# Patient Record
Sex: FEMALE | Race: BLACK OR AFRICAN AMERICAN | NOT HISPANIC OR LATINO | Employment: OTHER | URBAN - METROPOLITAN AREA
[De-identification: names, ages, dates, MRNs, and addresses within clinical notes are randomized per-mention and may not be internally consistent; named-entity substitution may affect disease eponyms.]

---

## 2024-04-22 ENCOUNTER — HOSPITAL ENCOUNTER (INPATIENT)
Facility: HOSPITAL | Age: 69
LOS: 1 days | Discharge: HOME HEALTH | DRG: 580 | End: 2024-04-25
Attending: EMERGENCY MEDICINE | Admitting: INTERNAL MEDICINE
Payer: MEDICARE

## 2024-04-22 DIAGNOSIS — R22.1 NECK SWELLING: ICD-10-CM

## 2024-04-22 DIAGNOSIS — L04.9 NECROTIZING INFLAMMATION OF LYMPH NODE: Primary | ICD-10-CM

## 2024-04-22 DIAGNOSIS — R07.9 CHEST PAIN: ICD-10-CM

## 2024-04-22 DIAGNOSIS — E83.52 HYPERCALCEMIA: ICD-10-CM

## 2024-04-22 LAB
ALBUMIN SERPL-MCNC: 3.6 G/DL (ref 3.4–4.8)
ALBUMIN/GLOB SERPL: 0.8 RATIO (ref 1.1–2)
ALP SERPL-CCNC: 57 UNIT/L (ref 40–150)
ALT SERPL-CCNC: 13 UNIT/L (ref 0–55)
AMPHET UR QL SCN: NEGATIVE
AST SERPL-CCNC: 15 UNIT/L (ref 5–34)
BARBITURATE SCN PRESENT UR: NEGATIVE
BASOPHILS # BLD AUTO: 0.03 X10(3)/MCL
BASOPHILS NFR BLD AUTO: 0.3 %
BENZODIAZ UR QL SCN: NEGATIVE
BILIRUB SERPL-MCNC: 0.5 MG/DL
BUN SERPL-MCNC: 14.1 MG/DL (ref 9.8–20.1)
CALCIUM SERPL-MCNC: 13.7 MG/DL (ref 8.4–10.2)
CANNABINOIDS UR QL SCN: NEGATIVE
CHLORIDE SERPL-SCNC: 100 MMOL/L (ref 98–107)
CO2 SERPL-SCNC: 25 MMOL/L (ref 23–31)
COCAINE UR QL SCN: NEGATIVE
CREAT SERPL-MCNC: 0.71 MG/DL (ref 0.55–1.02)
CRP SERPL-MCNC: 17.4 MG/L
DEPRECATED CALCIDIOL+CALCIFEROL SERPL-MC: 34.1 NG/ML (ref 30–80)
EOSINOPHIL # BLD AUTO: 0.05 X10(3)/MCL (ref 0–0.9)
EOSINOPHIL NFR BLD AUTO: 0.5 %
ERYTHROCYTE [DISTWIDTH] IN BLOOD BY AUTOMATED COUNT: 12.6 % (ref 11.5–17)
ERYTHROCYTE [SEDIMENTATION RATE] IN BLOOD: 28 MM/HR (ref 0–20)
FENTANYL UR QL SCN: NEGATIVE
GFR SERPLBLD CREATININE-BSD FMLA CKD-EPI: >60 MLS/MIN/1.73/M2
GLOBULIN SER-MCNC: 4.5 GM/DL (ref 2.4–3.5)
GLUCOSE SERPL-MCNC: 205 MG/DL (ref 82–115)
HCT VFR BLD AUTO: 39.1 % (ref 37–47)
HGB BLD-MCNC: 13.4 G/DL (ref 12–16)
HOLD SPECIMEN: NORMAL
IMM GRANULOCYTES # BLD AUTO: 0.02 X10(3)/MCL (ref 0–0.04)
IMM GRANULOCYTES NFR BLD AUTO: 0.2 %
LYMPHOCYTES # BLD AUTO: 2.58 X10(3)/MCL (ref 0.6–4.6)
LYMPHOCYTES NFR BLD AUTO: 27.8 %
MCH RBC QN AUTO: 30 PG (ref 27–31)
MCHC RBC AUTO-ENTMCNC: 34.3 G/DL (ref 33–36)
MCV RBC AUTO: 87.7 FL (ref 80–94)
MDMA UR QL SCN: NEGATIVE
MONOCYTES # BLD AUTO: 0.52 X10(3)/MCL (ref 0.1–1.3)
MONOCYTES NFR BLD AUTO: 5.6 %
NEUTROPHILS # BLD AUTO: 6.07 X10(3)/MCL (ref 2.1–9.2)
NEUTROPHILS NFR BLD AUTO: 65.6 %
NRBC BLD AUTO-RTO: 0 %
OPIATES UR QL SCN: NEGATIVE
PCP UR QL: NEGATIVE
PH UR: 7 [PH] (ref 3–11)
PLATELET # BLD AUTO: 477 X10(3)/MCL (ref 130–400)
PMV BLD AUTO: 8.6 FL (ref 7.4–10.4)
POTASSIUM SERPL-SCNC: 4.4 MMOL/L (ref 3.5–5.1)
PROT SERPL-MCNC: 8.1 GM/DL (ref 5.8–7.6)
PTH-INTACT SERPL-MCNC: 23.2 PG/ML (ref 8.7–77)
RBC # BLD AUTO: 4.46 X10(6)/MCL (ref 4.2–5.4)
SODIUM SERPL-SCNC: 132 MMOL/L (ref 136–145)
SPECIFIC GRAVITY, URINE AUTO (.000) (OHS): 1.02 (ref 1–1.03)
TSH SERPL-ACNC: 0.7 UIU/ML (ref 0.35–4.94)
WBC # SPEC AUTO: 9.27 X10(3)/MCL (ref 4.5–11.5)

## 2024-04-22 PROCEDURE — 63600175 PHARM REV CODE 636 W HCPCS

## 2024-04-22 PROCEDURE — 96372 THER/PROPH/DIAG INJ SC/IM: CPT | Mod: 59

## 2024-04-22 PROCEDURE — G0378 HOSPITAL OBSERVATION PER HR: HCPCS

## 2024-04-22 PROCEDURE — 99285 EMERGENCY DEPT VISIT HI MDM: CPT | Mod: 25

## 2024-04-22 PROCEDURE — 83970 ASSAY OF PARATHORMONE: CPT | Performed by: PHYSICIAN ASSISTANT

## 2024-04-22 PROCEDURE — 82306 VITAMIN D 25 HYDROXY: CPT

## 2024-04-22 PROCEDURE — 85025 COMPLETE CBC W/AUTO DIFF WBC: CPT | Performed by: PHYSICIAN ASSISTANT

## 2024-04-22 PROCEDURE — 96361 HYDRATE IV INFUSION ADD-ON: CPT

## 2024-04-22 PROCEDURE — 96375 TX/PRO/DX INJ NEW DRUG ADDON: CPT

## 2024-04-22 PROCEDURE — 80307 DRUG TEST PRSMV CHEM ANLYZR: CPT

## 2024-04-22 PROCEDURE — 93005 ELECTROCARDIOGRAM TRACING: CPT

## 2024-04-22 PROCEDURE — 25000003 PHARM REV CODE 250: Performed by: PHYSICIAN ASSISTANT

## 2024-04-22 PROCEDURE — 25000003 PHARM REV CODE 250

## 2024-04-22 PROCEDURE — 86140 C-REACTIVE PROTEIN: CPT | Performed by: PHYSICIAN ASSISTANT

## 2024-04-22 PROCEDURE — 85652 RBC SED RATE AUTOMATED: CPT | Performed by: PHYSICIAN ASSISTANT

## 2024-04-22 PROCEDURE — 25500020 PHARM REV CODE 255

## 2024-04-22 PROCEDURE — 84443 ASSAY THYROID STIM HORMONE: CPT | Performed by: PHYSICIAN ASSISTANT

## 2024-04-22 PROCEDURE — 80053 COMPREHEN METABOLIC PANEL: CPT | Performed by: PHYSICIAN ASSISTANT

## 2024-04-22 RX ORDER — ONDANSETRON 4 MG/1
8 TABLET, ORALLY DISINTEGRATING ORAL EVERY 8 HOURS PRN
Status: DISCONTINUED | OUTPATIENT
Start: 2024-04-22 | End: 2024-04-25 | Stop reason: HOSPADM

## 2024-04-22 RX ORDER — TALC
6 POWDER (GRAM) TOPICAL NIGHTLY PRN
Status: DISCONTINUED | OUTPATIENT
Start: 2024-04-22 | End: 2024-04-25 | Stop reason: HOSPADM

## 2024-04-22 RX ORDER — IBUPROFEN 200 MG
16 TABLET ORAL
Status: DISCONTINUED | OUTPATIENT
Start: 2024-04-22 | End: 2024-04-25 | Stop reason: HOSPADM

## 2024-04-22 RX ORDER — ACETAMINOPHEN 325 MG/1
650 TABLET ORAL EVERY 4 HOURS PRN
Status: DISCONTINUED | OUTPATIENT
Start: 2024-04-22 | End: 2024-04-25 | Stop reason: HOSPADM

## 2024-04-22 RX ORDER — CALCITONIN SALMON 200 [USP'U]/ML
4 INJECTION, SOLUTION INTRAMUSCULAR; SUBCUTANEOUS 2 TIMES DAILY
Status: COMPLETED | OUTPATIENT
Start: 2024-04-22 | End: 2024-04-24

## 2024-04-22 RX ORDER — LISINOPRIL 10 MG/1
10 TABLET ORAL DAILY
Status: DISCONTINUED | OUTPATIENT
Start: 2024-04-23 | End: 2024-04-25 | Stop reason: HOSPADM

## 2024-04-22 RX ORDER — LABETALOL HCL 20 MG/4 ML
10 SYRINGE (ML) INTRAVENOUS EVERY 4 HOURS PRN
Status: DISCONTINUED | OUTPATIENT
Start: 2024-04-22 | End: 2024-04-25 | Stop reason: HOSPADM

## 2024-04-22 RX ORDER — ALPRAZOLAM 0.25 MG/1
0.25 TABLET ORAL
Status: COMPLETED | OUTPATIENT
Start: 2024-04-22 | End: 2024-04-22

## 2024-04-22 RX ORDER — SODIUM CHLORIDE 0.9 % (FLUSH) 0.9 %
10 SYRINGE (ML) INJECTION
Status: DISCONTINUED | OUTPATIENT
Start: 2024-04-22 | End: 2024-04-25 | Stop reason: HOSPADM

## 2024-04-22 RX ORDER — ALPRAZOLAM 0.5 MG/1
0.5 TABLET ORAL
Status: COMPLETED | OUTPATIENT
Start: 2024-04-22 | End: 2024-04-22

## 2024-04-22 RX ORDER — HYDRALAZINE HYDROCHLORIDE 20 MG/ML
10 INJECTION INTRAMUSCULAR; INTRAVENOUS
Status: DISCONTINUED | OUTPATIENT
Start: 2024-04-22 | End: 2024-04-25 | Stop reason: HOSPADM

## 2024-04-22 RX ORDER — POLYETHYLENE GLYCOL 3350 17 G/17G
17 POWDER, FOR SOLUTION ORAL DAILY PRN
Status: DISCONTINUED | OUTPATIENT
Start: 2024-04-22 | End: 2024-04-25 | Stop reason: HOSPADM

## 2024-04-22 RX ORDER — PROCHLORPERAZINE EDISYLATE 5 MG/ML
5 INJECTION INTRAMUSCULAR; INTRAVENOUS EVERY 6 HOURS PRN
Status: DISCONTINUED | OUTPATIENT
Start: 2024-04-22 | End: 2024-04-25 | Stop reason: HOSPADM

## 2024-04-22 RX ORDER — ENOXAPARIN SODIUM 100 MG/ML
40 INJECTION SUBCUTANEOUS EVERY 24 HOURS
Status: DISCONTINUED | OUTPATIENT
Start: 2024-04-22 | End: 2024-04-25 | Stop reason: HOSPADM

## 2024-04-22 RX ORDER — NALOXONE HCL 0.4 MG/ML
0.02 VIAL (ML) INJECTION
Status: DISCONTINUED | OUTPATIENT
Start: 2024-04-22 | End: 2024-04-25 | Stop reason: HOSPADM

## 2024-04-22 RX ORDER — SODIUM CHLORIDE 9 MG/ML
1000 INJECTION, SOLUTION INTRAVENOUS
Status: COMPLETED | OUTPATIENT
Start: 2024-04-22 | End: 2024-04-22

## 2024-04-22 RX ORDER — CETIRIZINE HYDROCHLORIDE 10 MG/1
10 TABLET ORAL DAILY PRN
Status: DISCONTINUED | OUTPATIENT
Start: 2024-04-22 | End: 2024-04-25 | Stop reason: HOSPADM

## 2024-04-22 RX ORDER — INSULIN ASPART 100 [IU]/ML
0-5 INJECTION, SOLUTION INTRAVENOUS; SUBCUTANEOUS
Status: DISCONTINUED | OUTPATIENT
Start: 2024-04-23 | End: 2024-04-25 | Stop reason: HOSPADM

## 2024-04-22 RX ORDER — AMOXICILLIN 250 MG
1 CAPSULE ORAL 2 TIMES DAILY PRN
Status: DISCONTINUED | OUTPATIENT
Start: 2024-04-22 | End: 2024-04-25 | Stop reason: HOSPADM

## 2024-04-22 RX ORDER — GLUCAGON 1 MG
1 KIT INJECTION
Status: DISCONTINUED | OUTPATIENT
Start: 2024-04-22 | End: 2024-04-25 | Stop reason: HOSPADM

## 2024-04-22 RX ORDER — IBUPROFEN 200 MG
24 TABLET ORAL
Status: DISCONTINUED | OUTPATIENT
Start: 2024-04-22 | End: 2024-04-25 | Stop reason: HOSPADM

## 2024-04-22 RX ORDER — AMLODIPINE BESYLATE 5 MG/1
5 TABLET ORAL DAILY
Status: DISCONTINUED | OUTPATIENT
Start: 2024-04-22 | End: 2024-04-25 | Stop reason: HOSPADM

## 2024-04-22 RX ORDER — KETOROLAC TROMETHAMINE 30 MG/ML
30 INJECTION, SOLUTION INTRAMUSCULAR; INTRAVENOUS ONCE
Status: COMPLETED | OUTPATIENT
Start: 2024-04-23 | End: 2024-04-22

## 2024-04-22 RX ORDER — SIMETHICONE 80 MG
1 TABLET,CHEWABLE ORAL 3 TIMES DAILY PRN
Status: DISCONTINUED | OUTPATIENT
Start: 2024-04-22 | End: 2024-04-25 | Stop reason: HOSPADM

## 2024-04-22 RX ADMIN — ALPRAZOLAM 0.5 MG: 0.5 TABLET ORAL at 09:04

## 2024-04-22 RX ADMIN — CALCITONIN SALMON 268 UNITS: 200 INJECTION, SOLUTION INTRAMUSCULAR; SUBCUTANEOUS at 10:04

## 2024-04-22 RX ADMIN — IOHEXOL 100 ML: 350 INJECTION, SOLUTION INTRAVENOUS at 07:04

## 2024-04-22 RX ADMIN — ENOXAPARIN SODIUM 40 MG: 40 INJECTION SUBCUTANEOUS at 10:04

## 2024-04-22 RX ADMIN — SODIUM CHLORIDE, POTASSIUM CHLORIDE, SODIUM LACTATE AND CALCIUM CHLORIDE 1000 ML: 600; 310; 30; 20 INJECTION, SOLUTION INTRAVENOUS at 11:04

## 2024-04-22 RX ADMIN — KETOROLAC TROMETHAMINE 30 MG: 30 INJECTION, SOLUTION INTRAMUSCULAR; INTRAVENOUS at 11:04

## 2024-04-22 RX ADMIN — AMLODIPINE BESYLATE 5 MG: 5 TABLET ORAL at 10:04

## 2024-04-22 RX ADMIN — SODIUM CHLORIDE 1000 ML: 9 INJECTION, SOLUTION INTRAVENOUS at 07:04

## 2024-04-22 RX ADMIN — ALPRAZOLAM 0.25 MG: 0.25 TABLET ORAL at 06:04

## 2024-04-22 NOTE — ED PROVIDER NOTES
Encounter Date: 4/22/2024    I, Ted Correa MD, did conduct a face to face encounter with this patient initially seen by our advanced practice provider. I did perform a history and physical, did direct the evaluation and management, and do agree with the care as documented.         History     Chief Complaint   Patient presents with    Facial Swelling     Significant right sided facial and neck swelling x6 weeks. Denies trouble breathing or swallowing.      69-year-old female with past medical history significant for hypertension, hyperlipidemia and diabetes presents to ED complaining of approximately 8 week history right-sided facial and neck swelling.  Patient reports history of dental abscesses, but denies any history of similar neck swelling.  Patient reports she went to her NP around time of symptom onset and they told her it was an abscess and gave her antibiotics.  Reports that the antibiotics did help alleviate the swelling for a short period of time, but it has since returned and progressively worsened.  Reports mild pain that is exacerbated with palpation and neck rotation.  Reports that area of swelling has recently become very pruritic.  States she has never had any mammograms, but denies any breast mass, nipple inversion, skin color/texture changes or nipple discharge.  Denies history of smoking.  Also denies fever, chills, unintended weight loss, appetite changes, night sweats, generalized weakness, fatigue, dysphagia, stridor, drooling, trismus, dental pain chest pain, shortness of breath, hemoptysis, abdominal pain, blood in stool.  Patient is tachycardic on arrival with heart rate of 115.  Vitals otherwise unremarkable, patient is in no acute distress, but is tearful and reports she is very anxious regarding her current diagnosis.        Review of patient's allergies indicates:   Allergen Reactions    Codeine     Lortab [hydrocodone-acetaminophen] Hallucinations    Tramadol      No past  medical history on file.  No past surgical history on file.  No family history on file.     Review of Systems   All other systems reviewed and are negative.      Physical Exam     Initial Vitals [04/22/24 1810]   BP Pulse Resp Temp SpO2   (!) 161/90 (!) 115 20 97.7 °F (36.5 °C) 97 %      MAP       --         Physical Exam    Nursing note and vitals reviewed.  Constitutional: She appears well-developed and well-nourished. She is not diaphoretic. No distress.   HENT:   Head: Normocephalic and atraumatic.   Mouth/Throat: Uvula is midline, oropharynx is clear and moist and mucous membranes are normal. No trismus in the jaw. Abnormal dentition. Dental caries present. No dental abscesses or uvula swelling. No oropharyngeal exudate, posterior oropharyngeal edema or posterior oropharyngeal erythema.   Eyes: Conjunctivae and EOM are normal. Pupils are equal, round, and reactive to light.   Neck: Neck supple.       Normal range of motion.  Cardiovascular:  Normal rate, regular rhythm, normal heart sounds and intact distal pulses.     Exam reveals no gallop and no friction rub.       No murmur heard.  Pulmonary/Chest: Breath sounds normal. No stridor. No respiratory distress. She has no wheezes. She has no rhonchi. She has no rales.   Abdominal: Abdomen is soft. Bowel sounds are normal. She exhibits no distension. There is no abdominal tenderness. There is no rebound and no guarding.   Musculoskeletal:         General: No tenderness or edema. Normal range of motion.      Cervical back: Normal range of motion and neck supple. Edema present. No rigidity. Normal range of motion.     Lymphadenopathy:        Right: Supraclavicular adenopathy present.   Neurological: She is alert and oriented to person, place, and time. She has normal strength. No cranial nerve deficit. GCS score is 15. GCS eye subscore is 4. GCS verbal subscore is 5. GCS motor subscore is 6.   Skin: Skin is warm and dry. Capillary refill takes less than 2 seconds.  No rash noted. No pallor.   Psychiatric: Her speech is normal and behavior is normal. Judgment and thought content normal. Her mood appears anxious. Her affect is not angry, not blunt, not labile and not inappropriate. She is not actively hallucinating. Cognition and memory are normal. She does not exhibit a depressed mood. She is attentive.         ED Course   Procedures  Labs Reviewed   COMPREHENSIVE METABOLIC PANEL - Abnormal; Notable for the following components:       Result Value    Sodium Level 132 (*)     Glucose Level 205 (*)     Calcium Level Total 13.7 (*)     Protein Total 8.1 (*)     Globulin 4.5 (*)     Albumin/Globulin Ratio 0.8 (*)     All other components within normal limits   CBC WITH DIFFERENTIAL - Abnormal; Notable for the following components:    Platelet 477 (*)     All other components within normal limits   C-REACTIVE PROTEIN - Abnormal; Notable for the following components:    C-Reactive Protein 17.40 (*)     All other components within normal limits   SEDIMENTATION RATE, AUTOMATED - Abnormal; Notable for the following components:    Sed Rate 28 (*)     All other components within normal limits   TSH - Normal   PTH, INTACT - Normal   VITAMIN D - Normal   CBC W/ AUTO DIFFERENTIAL    Narrative:     The following orders were created for panel order CBC Auto Differential.  Procedure                               Abnormality         Status                     ---------                               -----------         ------                     CBC with Differential[[id number]]       Abnormal            Final result                 Please view results for these tests on the individual orders.   EXTRA TUBES    Narrative:     The following orders were created for panel order EXTRA TUBES.  Procedure                               Abnormality         Status                     ---------                               -----------         ------                     Light Blue Top Hold[[id number]]                              Final result               Gold Top Hold[[id number]]                                   Final result               Pink Top Hold[[id number]]                                   Final result                 Please view results for these tests on the individual orders.   LIGHT BLUE TOP HOLD   GOLD TOP HOLD   PINK TOP HOLD   PROTEIN ELECTROPHORESIS, TIMED URINE   IMMUNOGLOBULIN FREE LT CHAINS BLOOD   IMMUNOFIXATION AND PROTEIN ELECTROPHORESIS     EKG Readings: (Independently Interpreted)   Initial Reading: No STEMI. Rhythm: Sinus Tachycardia. Heart Rate: 112. Ectopy: No Ectopy. ST Segments: Normal ST Segments. Axis: Normal. [facility] ED EKG2 - Other Findings: Shortened QT interval. Clinical Impression: Normal Sinus Rhythm     ECG Results              EKG 12-lead (In process)        Collection Time Result Time QRS Duration [facility] QTC Calculation    04/22/24 19:41:44 04/23/24 06:45:35 74 395                     In process by Interface, Lab In [facility] (04/23/24 06:45:42)                   Narrative:    Test Reason : E83.52,    Vent. Rate : 112 BPM     Atrial Rate : 112 BPM     P-R Int : 146 ms          QRS Dur : 074 ms      QT Int : 290 ms       P-R-T Axes : 049 011 033 degrees     QTc Int : 395 ms    Sinus tachycardia  Possible Left atrial enlargement  Low voltage QRS  Cannot rule out Anterior infarct ,age undetermined  Abnormal ECG  No previous ECGs available    Referred By: [email or web address]   SELF           Confirmed By:                       In process by Interface, Lab In [facility] (04/23/24 06:42:51)                   Narrative:    Test Reason : E83.52,    Vent. Rate : 112 BPM     Atrial Rate : 112 BPM     P-R Int : 146 ms          QRS Dur : 074 ms      QT Int : 290 ms       P-R-T Axes : 049 011 033 degrees     QTc Int : 395 ms    Sinus tachycardia  Possible Left atrial enlargement  Low voltage QRS  Cannot rule out Anterior infarct ,age undetermined  Abnormal ECG  No previous ECGs available    Referred By:  [email or web address]   SELF           Confirmed By:                                   Imaging Results              CT Soft Tissue Neck With Contrast (Final result)  Result time 04/22/24 20:47:44      Final result by Hilton Ricks MD (04/22/24 20:47:44)                   Impression:      Bilateral neck lymphadenopathy which is extensive on the right with central necrotic changes.  Findings may represent lymphoma leukemia leukemia or metastatic pathology.      Electronically signed by: Hilton Ricks  Date:    04/22/2024  Time:    20:47               Narrative:    EXAMINATION:  CT SOFT TISSUE NECK WITH CONTRAST    CLINICAL HISTORY:  large R sided neck mass extending into chest;    TECHNIQUE:  Multidetector axial images were performed of the neck following intravenous contrast administration and the images were reformatted.    Dose length product was 978 mGycm. Automated radiation control was utilized to minimize radiation dose.    COMPARISON:  None available    FINDINGS:  There are right neck intraparotid, periparotid, submandibular, jugulodigastric, deep cervical and lower neck numerous variable size masses consistent with lymphadenopathy.  Reference right jugulodigastric enlarged lymph node is 2.4 x 2.2 cm on image 39 series 2.  Two adjacent large centrally necrotic lymph nodes within the lower neck measure 3.7 x 4.5 cm and 3.5 x 3.7 cm on image 54 series 2.  There are also mildly enlarged left neck lymph nodes.    There are no parapharyngeal including fossa of Rosenmuller inflammatory changes. The epiglottis is not thickened and there are no findings of the larynx and the trachea.There is no asymmetric fullness or inflammatory changes of the aryepiglottic folds.                                       CT Chest Abdomen Pelvis With IV Contrast (XPD) Routine Oral Contrast (Final result)  Result time 04/22/24 20:55:14      Final result by Hilton Ricks MD (04/22/24 20:55:14)                   Impression:      1.  Right lower  neck large necrotic lymph node proceeds inferiorly to the axillary region.  Additional right supraclavicular and infraclavicular lymph nodes.  No mediastinal or hilar lymphadenopathy.    2.  No chest acute infiltrates or effusions.    3.  No abdominopelvic visceral acute findings or lymphadenopathy.  Details of the findings above.      Electronically signed by: Hilton Ricks  Date:    04/22/2024  Time:    20:55               Narrative:    EXAMINATION:  CT CHEST ABDOMEN PELVIS WITH IV CONTRAST (XPD)    CLINICAL HISTORY:  large upper chest mass extending into R side of neck;    TECHNIQUE:  Multidetector axial images were obtained from the thoracic inlet through the greater trochanters following the administration of IV contrast.    Dose length product of 978 mGycm. Automated exposure control was utilized to minimize radiation dose.    COMPARISON:  None available.    CHEST FINDINGS:    The lungs are unremarkable without suspicious soft tissue pulmonary nodule, parenchyma consolidation, interstitial disease, pleural effusion or pneumothorax.  There are right lower lung lobe hypoventilatory changes.    There are right supraclavicular and infraclavicular enlarged lymph nodes.  Right lower neck large  necrotic lymph node proceeds inferiorly towards the axillary region.  There are no mediastinal or hilar enlarged lymph nodes.  Thoracic aorta is without aneurysmal dilatation or dissection.  Cardiac chamber size is within normal limits.    ABDOMINAL FINDINGS:    Liver and spleen are not enlarged in size.  Right hepatic lobe small hypodensity is not characterized and may represent a cyst on image 50 series 2.  Gallbladder lumen is without calcified calculus and there is no thickening of the wall or pericholecystic fluid.  No apparent dilatation of ducts.  Pancreas is unremarkable.  Adrenals are normal size configuration.  Unremarkable enhancement of kidneys and the collecting systems are without dilatation.  There are no  mesenteric or retroperitoneal periaortic enlarged lymph nodes.    Stomach is partially decompressed.  No abnormal dilatation of loops of small bowel and there is no focal or generalized mural thickening.  Colonic fecal loading without abnormal dilatation and there are no pericolonic acute strandings.  There is sigmoid colon noninflamed diverticulosis coli.  No bowel obstruction.  No free fluid.    PELVIC FINDINGS:    Urinary bladder wall is not thickened.  There is lobular contour of the uterus which may be related to fibroid.  Endometrium is not delineated.  No pelvic free fluid.  There are subcentimeter bilateral inguinal lymph nodes.    No acute or aggressive skeletal abnormality                                       Medications   sodium chloride 0.9% flush 10 mL (has no administration in time range)   melatonin tablet 6 mg (has no administration in time range)   ondansetron disintegrating tablet 8 mg (has no administration in time range)   prochlorperazine injection Soln 5 mg (has no administration in time range)   polyethylene glycol packet 17 g (has no administration in time range)   senna-docusate 8.6-50 mg per tablet 1 tablet (has no administration in time range)   acetaminophen tablet 650 mg (650 mg Oral Given 4/23/24 0131)   naloxone 0.4 mg/mL injection 0.02 mg (has no administration in time range)   glucose chewable tablet 16 g (has no administration in time range)   glucose chewable tablet 24 g (has no administration in time range)   glucagon (human recombinant) injection 1 mg (has no administration in time range)   enoxaparin injection 40 mg (40 mg Subcutaneous Given 4/23/24 6274)   dextrose 10% bolus 125 mL 125 mL (has no administration in time range)   dextrose 10% bolus 250 mL 250 mL (has no administration in time range)   calcitonin injection 268 Units (268 Units Subcutaneous Given 4/23/24 0937)   hydrALAZINE injection 10 mg (has no administration in time range)   labetalol 20 mg/4 mL (5 mg/mL) IV  syring (has no administration in time range)   cetirizine tablet 10 mg (has no administration in time range)   simethicone chewable tablet 80 mg (has no administration in time range)   amLODIPine tablet 5 mg (5 mg Oral Given 4/23/24 0807)   insulin aspart U-100 injection 0-5 Units (has no administration in time range)   lisinopriL tablet 10 mg (10 mg Oral Given 4/23/24 0807)   atorvastatin tablet 20 mg (20 mg Oral Given 4/23/24 0016)   0.9%  NaCl infusion ( Intravenous Verify Only 4/23/24 1902)   ALPRAZolam tablet 0.25 mg (0.25 mg Oral Given 4/22/24 1855)   iohexoL (OMNIPAQUE 350) 350 mg iodine/mL injection (100 mLs Intravenous Given 4/22/24 1900)   0.9%  NaCl infusion (0 mLs Intravenous Stopped 4/22/24 2045)   ALPRAZolam tablet 0.5 mg (0.5 mg Oral Given 4/22/24 2118)   lactated ringers bolus 1,000 mL (0 mLs Intravenous Stopped 4/23/24 0302)   ketorolac injection 30 mg (30 mg Intravenous Given 4/22/24 2334)   ibuprofen tablet 800 mg (800 mg Oral Given 4/23/24 0807)   zoledronic acid (ZOMETA) 4 mg in sodium chloride 0.9% 100 mL IVPB (0 mg Intravenous Stopped 4/23/24 1101)     Medical Decision Making  Differential diagnosis: Includes but not limited to malignancy, benign mass, abscess    ED management:  Patient given 0.25 mg dose of Xanax for anxiety, improving.  Once notified of critical hypercalcemia, I have initiated IV fluids with normal saline bolus.    ED course:  CT soft tissue neck reveals bilateral neck lymphadenopathy that is extensive on the right with central necrotic changes concerning for malignant process.  There is no evidence of impending airway compromise on CT.  CT of chest, abdomen and pelvis reflects findings from neck CT, along with right supraclavicular and infraclavicular lymph nodes, but shows no evidence of any additional acute chest or abdominopelvic findings.   CBC reveals mild thrombocytosis, but is overall unremarkable.  CMP reveals critical hypercalcemia the correct to 14.  CMP also  reveals mild hyponatremia of 132 and mild hyperglycemia of 205 with normal anion gap.  PTH within normal limits.  TSH within normal limits.  Inflammatory markers mildly elevated with CRP of 17.4 and sed rate of 28.  EKG obtained and shows mild shortening of QT interval, but overall unremarkable.      Amount and/or Complexity of Data Reviewed  Labs: ordered. Decision-making details documented in ED Course.     Details:  Patient currently resting comfortably in bed with heart rate of 110, but overall unremarkable vitals.  I have consulted medicine for admission for hypercalcemia and malignancy workup.  Patient informed of today's findings and is amenable to admission.  Radiology: ordered. Decision-making details documented in ED Course.  ECG/medicine tests: ordered and independent interpretation performed. Decision-making details documented in ED Course.  Discussion of management or test interpretation with external provider(s): Case discussed with Dr. Correa and he reviewed all of today's diagnostic workup and performed face-to-face evaluation at bedside, all of his recommendations followed.    Risk  Prescription drug management.               ED Course as of 04/23/24 2118 Mon Apr 22, 2024 1926 Nurse taking care of patient informed me of critical calcium of 13.7, which is 14 when corrected for mild hypoalbuminemia. I have ordered EKG, cardiac monitoring & PTH. [NB]      ED Course User Index  [NB] Darwin Benson PA                           Clinical Impression:  Final diagnoses:  [E83.52] Hypercalcemia  [L04.9] Necrotizing inflammation of lymph node - Right side of neck, extensive (Primary)          ED Disposition Condition    Observation                 Darwin Benson PA  04/22/24 2105       Ted Correa MD  04/23/24 2118

## 2024-04-23 LAB
ALBUMIN SERPL-MCNC: 3.2 G/DL (ref 3.4–4.8)
ALBUMIN/GLOB SERPL: 0.7 RATIO (ref 1.1–2)
ALP SERPL-CCNC: 52 UNIT/L (ref 40–150)
ALT SERPL-CCNC: 12 UNIT/L (ref 0–55)
APPEARANCE UR: CLEAR
AST SERPL-CCNC: 15 UNIT/L (ref 5–34)
BACTERIA #/AREA URNS AUTO: ABNORMAL /HPF
BASOPHILS # BLD AUTO: 0.02 X10(3)/MCL
BASOPHILS NFR BLD AUTO: 0.3 %
BILIRUB SERPL-MCNC: 0.5 MG/DL
BILIRUB UR QL STRIP.AUTO: NEGATIVE
BUN SERPL-MCNC: 7.5 MG/DL (ref 9.8–20.1)
CALCIUM SERPL-MCNC: 12.2 MG/DL (ref 8.4–10.2)
CHLORIDE SERPL-SCNC: 104 MMOL/L (ref 98–107)
CO2 SERPL-SCNC: 27 MMOL/L (ref 23–31)
COLOR UR AUTO: COLORLESS
CREAT SERPL-MCNC: 0.57 MG/DL (ref 0.55–1.02)
EOSINOPHIL # BLD AUTO: 0.03 X10(3)/MCL (ref 0–0.9)
EOSINOPHIL NFR BLD AUTO: 0.4 %
ERYTHROCYTE [DISTWIDTH] IN BLOOD BY AUTOMATED COUNT: 12.7 % (ref 11.5–17)
GFR SERPLBLD CREATININE-BSD FMLA CKD-EPI: >60 MLS/MIN/1.73/M2
GLOBULIN SER-MCNC: 4.3 GM/DL (ref 2.4–3.5)
GLUCOSE SERPL-MCNC: 164 MG/DL (ref 82–115)
GLUCOSE UR QL STRIP.AUTO: NORMAL
HCT VFR BLD AUTO: 36.2 % (ref 37–47)
HGB BLD-MCNC: 12.4 G/DL (ref 12–16)
HIV 1+2 AB+HIV1 P24 AG SERPL QL IA: NONREACTIVE
HYALINE CASTS #/AREA URNS LPF: ABNORMAL /LPF
IGA SERPL-MCNC: 171 MG/DL (ref 69–517)
IGG SERPL-MCNC: 1481 MG/DL (ref 522–1631)
IGM SERPL-MCNC: 104 MG/DL (ref 33–293)
IMM GRANULOCYTES # BLD AUTO: 0.01 X10(3)/MCL (ref 0–0.04)
IMM GRANULOCYTES NFR BLD AUTO: 0.1 %
KETONES UR QL STRIP.AUTO: NEGATIVE
LEUKOCYTE ESTERASE UR QL STRIP.AUTO: NEGATIVE
LYMPHOCYTES # BLD AUTO: 2.33 X10(3)/MCL (ref 0.6–4.6)
LYMPHOCYTES NFR BLD AUTO: 29.2 %
MAGNESIUM SERPL-MCNC: 1.4 MG/DL (ref 1.6–2.6)
MCH RBC QN AUTO: 29.7 PG (ref 27–31)
MCHC RBC AUTO-ENTMCNC: 34.3 G/DL (ref 33–36)
MCV RBC AUTO: 86.8 FL (ref 80–94)
MONOCYTES # BLD AUTO: 0.5 X10(3)/MCL (ref 0.1–1.3)
MONOCYTES NFR BLD AUTO: 6.3 %
NEUTROPHILS # BLD AUTO: 5.08 X10(3)/MCL (ref 2.1–9.2)
NEUTROPHILS NFR BLD AUTO: 63.7 %
NITRITE UR QL STRIP.AUTO: NEGATIVE
NRBC BLD AUTO-RTO: 0 %
PH UR STRIP.AUTO: 7 [PH]
PHOSPHATE SERPL-MCNC: 2 MG/DL (ref 2.3–4.7)
PLATELET # BLD AUTO: 426 X10(3)/MCL (ref 130–400)
PMV BLD AUTO: 8.4 FL (ref 7.4–10.4)
POCT GLUCOSE: 128 MG/DL (ref 70–110)
POCT GLUCOSE: 137 MG/DL (ref 70–110)
POCT GLUCOSE: 152 MG/DL (ref 70–110)
POCT GLUCOSE: 173 MG/DL (ref 70–110)
POTASSIUM SERPL-SCNC: 4.2 MMOL/L (ref 3.5–5.1)
PROT SERPL-MCNC: 7.5 GM/DL (ref 5.8–7.6)
PROT UR QL STRIP.AUTO: NEGATIVE
RBC # BLD AUTO: 4.17 X10(6)/MCL (ref 4.2–5.4)
RBC #/AREA URNS AUTO: ABNORMAL /HPF
RBC UR QL AUTO: ABNORMAL
SODIUM SERPL-SCNC: 137 MMOL/L (ref 136–145)
SP GR UR STRIP.AUTO: 1.02 (ref 1–1.03)
SQUAMOUS #/AREA URNS LPF: ABNORMAL /HPF
UROBILINOGEN UR STRIP-ACNC: NORMAL
WBC # SPEC AUTO: 7.97 X10(3)/MCL (ref 4.5–11.5)
WBC #/AREA URNS AUTO: ABNORMAL /HPF

## 2024-04-23 PROCEDURE — 84166 PROTEIN E-PHORESIS/URINE/CSF: CPT

## 2024-04-23 PROCEDURE — 96372 THER/PROPH/DIAG INJ SC/IM: CPT

## 2024-04-23 PROCEDURE — 63600175 PHARM REV CODE 636 W HCPCS: Performed by: INTERNAL MEDICINE

## 2024-04-23 PROCEDURE — 86665 EPSTEIN-BARR CAPSID VCA: CPT

## 2024-04-23 PROCEDURE — 84100 ASSAY OF PHOSPHORUS: CPT

## 2024-04-23 PROCEDURE — 86644 CMV ANTIBODY: CPT

## 2024-04-23 PROCEDURE — 83521 IG LIGHT CHAINS FREE EACH: CPT

## 2024-04-23 PROCEDURE — 25000003 PHARM REV CODE 250: Performed by: INTERNAL MEDICINE

## 2024-04-23 PROCEDURE — 63600175 PHARM REV CODE 636 W HCPCS

## 2024-04-23 PROCEDURE — 96365 THER/PROPH/DIAG IV INF INIT: CPT

## 2024-04-23 PROCEDURE — 82397 CHEMILUMINESCENT ASSAY: CPT

## 2024-04-23 PROCEDURE — 82652 VIT D 1 25-DIHYDROXY: CPT | Performed by: INTERNAL MEDICINE

## 2024-04-23 PROCEDURE — 96361 HYDRATE IV INFUSION ADD-ON: CPT

## 2024-04-23 PROCEDURE — 81001 URINALYSIS AUTO W/SCOPE: CPT

## 2024-04-23 PROCEDURE — 25000003 PHARM REV CODE 250

## 2024-04-23 PROCEDURE — 82784 ASSAY IGA/IGD/IGG/IGM EACH: CPT

## 2024-04-23 PROCEDURE — 80053 COMPREHEN METABOLIC PANEL: CPT

## 2024-04-23 PROCEDURE — 85025 COMPLETE CBC W/AUTO DIFF WBC: CPT

## 2024-04-23 PROCEDURE — 36415 COLL VENOUS BLD VENIPUNCTURE: CPT

## 2024-04-23 PROCEDURE — 84165 PROTEIN E-PHORESIS SERUM: CPT

## 2024-04-23 PROCEDURE — 83735 ASSAY OF MAGNESIUM: CPT

## 2024-04-23 PROCEDURE — 87389 HIV-1 AG W/HIV-1&-2 AB AG IA: CPT

## 2024-04-23 PROCEDURE — G0378 HOSPITAL OBSERVATION PER HR: HCPCS

## 2024-04-23 RX ORDER — ATORVASTATIN CALCIUM 20 MG/1
20 TABLET, FILM COATED ORAL NIGHTLY
Status: DISCONTINUED | OUTPATIENT
Start: 2024-04-23 | End: 2024-04-25 | Stop reason: HOSPADM

## 2024-04-23 RX ORDER — KETOROLAC TROMETHAMINE 10 MG/1
10 TABLET, FILM COATED ORAL EVERY 8 HOURS PRN
Status: DISCONTINUED | OUTPATIENT
Start: 2024-04-23 | End: 2024-04-23

## 2024-04-23 RX ORDER — IBUPROFEN 400 MG/1
800 TABLET ORAL ONCE
Status: COMPLETED | OUTPATIENT
Start: 2024-04-23 | End: 2024-04-23

## 2024-04-23 RX ORDER — OXYCODONE HYDROCHLORIDE 5 MG/1
5 TABLET ORAL EVERY 8 HOURS PRN
Status: DISCONTINUED | OUTPATIENT
Start: 2024-04-23 | End: 2024-04-25

## 2024-04-23 RX ORDER — SODIUM CHLORIDE 9 MG/ML
INJECTION, SOLUTION INTRAVENOUS CONTINUOUS
Status: DISCONTINUED | OUTPATIENT
Start: 2024-04-23 | End: 2024-04-25

## 2024-04-23 RX ORDER — ZOLEDRONIC ACID 0.04 MG/ML
4 INJECTION, SOLUTION INTRAVENOUS
Status: DISCONTINUED | OUTPATIENT
Start: 2024-04-23 | End: 2024-04-23

## 2024-04-23 RX ADMIN — CALCITONIN SALMON 268 UNITS: 200 INJECTION, SOLUTION INTRAMUSCULAR; SUBCUTANEOUS at 09:04

## 2024-04-23 RX ADMIN — ATORVASTATIN CALCIUM 20 MG: 20 TABLET, FILM COATED ORAL at 09:04

## 2024-04-23 RX ADMIN — ZOLEDRONIC ACID 4 MG: 4 INJECTION, SOLUTION, CONCENTRATE INTRAVENOUS at 10:04

## 2024-04-23 RX ADMIN — AMLODIPINE BESYLATE 5 MG: 5 TABLET ORAL at 08:04

## 2024-04-23 RX ADMIN — ENOXAPARIN SODIUM 40 MG: 40 INJECTION SUBCUTANEOUS at 05:04

## 2024-04-23 RX ADMIN — ACETAMINOPHEN 650 MG: 325 TABLET, FILM COATED ORAL at 01:04

## 2024-04-23 RX ADMIN — IBUPROFEN 800 MG: 400 TABLET, FILM COATED ORAL at 08:04

## 2024-04-23 RX ADMIN — OXYCODONE HYDROCHLORIDE 5 MG: 5 TABLET ORAL at 09:04

## 2024-04-23 RX ADMIN — SODIUM CHLORIDE: 9 INJECTION, SOLUTION INTRAVENOUS at 08:04

## 2024-04-23 RX ADMIN — ATORVASTATIN CALCIUM 20 MG: 20 TABLET, FILM COATED ORAL at 12:04

## 2024-04-23 RX ADMIN — LISINOPRIL 10 MG: 10 TABLET ORAL at 08:04

## 2024-04-23 NOTE — PROGRESS NOTES
"[facility] Internal Medicine Progress Note    Subjective:   Zoë Bone is a 69 y.o.  female with PMH of tobacco use disorder (quit approximately 2014), hypertension, hyperlipidemia, diabetes mellitus type II, pernicious anemia, dental abscess s/p tooth extraction, who presented to [facility] ED (4/22/2024) due to right sided neck swelling. Patient states that symptoms began approximately 1 month ago at which time patient developed right  sided facial swelling. Patient suspected she had a dental abscess as patient has had a dental abscess in the past requiring tooth extraction (roughly 10-15 years ago). She presented to her PCP for evaluation who patient states told her "she had an abscess" at which time she was prescribed an antibiotic which patient does not recall. Patient adhered to antibiotic regimen with the swelling was reduced at first but swelling continued to worsen such that swelling began to extend to the right neck from the right face prompting patient to present to [facility] ED for evaluation. She denies noting any overlying redness or rashes nor pain to the affected area. Denies fever, chills, sweats. Denies chest pain, cough, hemoptysis, shortness of breath. Denies abdominal pain, decreased appetite, involuntary weight loss, nausea, vomiting, hematemesis, constipation, diarrhea, hematochezia, melena. Denies increased or decreased urinary frequency, dysuria, hematuria. Denies lower extremity swelling, pain, rashes.  Patient has not have a family history of cancer.  She denies any use of oral tobacco, IV drugs, HIV, or any recent sick contacts.     ED course: Vitals show patient hypertensive, tachycardic, and tachypnic but saturations stable on room air. CBC unremarkable. CRP 17.40. ESR 28. CMP showed borderline severe hypercalcemia with calcium 13.7 and hyponatremia with mild sodium 132. CT soft tissue neck (04/23/2024) showed bilateral neck lymphadenopathy more extensive on right compared to left with " "necrotic changes on right neck. CT chest abdomen pelvis with and without contrast (2024) showed continuation of right necrotic lymph node involvement inferiorly to the right axilla but no mediastinal or hilar lymphadenopathy. Internal medicine consulted for evaluation and management of right sided head and neck swelling concerning for underlying malignancy.        24hr interval   Patient resting in bed comfortably.  She was complaining of a headache post Toradol.  She was also complaining of some discomfort at the right facial swelling.  Patient allergic to Norco.  We will try ibuprofen 800.  On physical exam her facial swelling is hard and painless.  CT imaging suggestive of necrotic lymph nodes extending from neck to right axilla.  Concern for lymphoma is very high.  IR consulted for biopsy.  They will see the patient tomorrow and likely attempt biopsy Thursday.  Patient hypercalcemia is improving.  Bisphosphonate added to calcitonin.  Vitamin hemodynamically patient was stable.  she denies fevers, headache, chest pain, SOB, N/V/D and abdominal pain.     Objective:   Last 24 Hour Vital Signs:  BP  Min: 111/67  Max: 173/88  Temp  Av.4 °F (36.9 °C)  Min: 97.7 °F (36.5 °C)  Max: 99 °F (37.2 °C)  Pulse  Av.3  Min: 89  Max: 117  Resp  Av.1  Min: 18  Max: 26  SpO2  Av.7 %  Min: 93 %  Max: 97 %  Height  Av' 2" (157.5 cm)  Min: 5' 2" (157.5 cm)  Max: 5' 2" (157.5 cm)  Weight  Av.1 kg (156 lb 13.3 oz)  Min: 66.8 kg (147 lb 4.3 oz)  Max: 75.5 kg (166 lb 6.4 oz)  I/O last 3 completed shifts:  In: 1120 [P.O.:120; I.V.:1000]  Out: 650 [Urine:650]    Physical Examination:  Physical Examination:  Vitals:   Vitals:    24 1114   BP: 111/67   Pulse: 93   Resp: 18   Temp: 98 °F (36.7 °C)     Physical Exam  Vitals reviewed.   Constitutional:       General: She is in acute distress.      Appearance: Normal appearance. She is normal weight. She is not ill-appearing, toxic-appearing or " diaphoretic.   HENT:      Head: Normocephalic and atraumatic.      Comments: Right face and neck edematous and indurated spanning from right pre and post auricular area down to right clavicle  Excoriations noted to base of right neck      Right Ear: External ear normal.      Left Ear: External ear normal.   Eyes:      General: No scleral icterus.        Right eye: No discharge.         Left eye: No discharge.      Extraocular Movements: Extraocular movements intact.      Conjunctiva/sclera: Conjunctivae normal.      Pupils: Pupils are equal, round, and reactive to light.   Cardiovascular:      Rate and Rhythm: Normal rate and regular rhythm.      Pulses: Normal pulses.      Heart sounds: Normal heart sounds. No murmur heard.     No friction rub. No gallop.   Pulmonary:      Effort: Pulmonary effort is normal. No respiratory distress.      Breath sounds: Normal breath sounds. No wheezing, rhonchi or rales.   Abdominal:      General: Abdomen is flat. Bowel sounds are normal. There is no distension.      Palpations: Abdomen is soft.      Tenderness: There is no abdominal tenderness. There is no guarding.   Musculoskeletal:         General: No swelling, tenderness, deformity or signs of injury. Normal range of motion.      Right lower leg: No edema.      Left lower leg: No edema.   Skin:     General: Skin is warm and dry.      Capillary Refill: Capillary refill takes less than 2 seconds.      Coloration: Skin is not jaundiced.      Findings: No bruising, erythema or rash.   Neurological:      Mental Status: She is alert.      Comments: AAO to person, place, time, and situation; CN II-XII grossly intact     Laboratory:    Recent Labs   Lab 04/22/24  1832 04/23/24  0430   WBC 9.27 7.97   HGB 13.4 12.4   HCT 39.1 36.2*   * 426*   MCV 87.7 86.8   RDW 12.6 12.7   * 137   K 4.4 4.2   CO2 25 27   BUN 14.1 7.5*   CREATININE 0.71 0.57   ALBUMIN 3.6 3.2*   BILITOT 0.5 0.5   AST 15 15   ALKPHOS 57 52   ALT 13 12  "      Coags: No results found for: "INR", "PROTIME", "PTT"  FLP: No results found for: "CHOL", "HDL", "LDLCALC", "TRIG", "CHOLHDL"  DM:   Lab Results   Component Value Date    CREATININE 0.57 04/23/2024     Thyroid:   Lab Results   Component Value Date    TSH 0.703 04/22/2024     Anemia: No results found for: "IRON", "TIBC", "FERRITIN", "RMTPFDGI91", "FOLATE"  Cardiac: No results found for: "TROPONINI", "CKTOTAL", "CKMB", "BNP"  Pulm:   No results found for: "PO2ART", "FIO2", "PEEP"   Urinalysis:   Lab Results   Component Value Date    LABURIN No Growth 06/27/2023    PHUA 7.0 04/23/2024    UROBILINOGEN Normal 04/23/2024    WBCUA 0-5 04/23/2024       Trended Cardiac Data:  No results for input(s): "TROPONINI", "CKTOTAL", "CKMB", "BNP" in the last 168 hours.      Other Results:      Radiology:  Imaging Results              CT Soft Tissue Neck With Contrast (Final result)  Result time 04/22/24 20:47:44      Final result by Hilton Ricks MD (04/22/24 20:47:44)                   Impression:      Bilateral neck lymphadenopathy which is extensive on the right with central necrotic changes.  Findings may represent lymphoma leukemia leukemia or metastatic pathology.      Electronically signed by: Hilton Ricks  Date:    04/22/2024  Time:    20:47               Narrative:    EXAMINATION:  CT SOFT TISSUE NECK WITH CONTRAST    CLINICAL HISTORY:  large R sided neck mass extending into chest;    TECHNIQUE:  Multidetector axial images were performed of the neck following intravenous contrast administration and the images were reformatted.    Dose length product was 978 mGycm. Automated radiation control was utilized to minimize radiation dose.    COMPARISON:  None available    FINDINGS:  There are right neck intraparotid, periparotid, submandibular, jugulodigastric, deep cervical and lower neck numerous variable size masses consistent with lymphadenopathy.  Reference right jugulodigastric enlarged lymph node is 2.4 x 2.2 cm on " image 39 series 2.  Two adjacent large centrally necrotic lymph nodes within the lower neck measure 3.7 x 4.5 cm and 3.5 x 3.7 cm on image 54 series 2.  There are also mildly enlarged left neck lymph nodes.    There are no parapharyngeal including fossa of Rosenmuller inflammatory changes. The epiglottis is not thickened and there are no findings of the larynx and the trachea.There is no asymmetric fullness or inflammatory changes of the aryepiglottic folds.                                       CT Chest Abdomen Pelvis With IV Contrast (XPD) Routine Oral Contrast (Final result)  Result time 04/22/24 20:55:14      Final result by Hilton Ricks MD (04/22/24 20:55:14)                   Impression:      1.  Right lower neck large necrotic lymph node proceeds inferiorly to the axillary region.  Additional right supraclavicular and infraclavicular lymph nodes.  No mediastinal or hilar lymphadenopathy.    2.  No chest acute infiltrates or effusions.    3.  No abdominopelvic visceral acute findings or lymphadenopathy.  Details of the findings above.      Electronically signed by: Hilton Ricks  Date:    04/22/2024  Time:    20:55               Narrative:    EXAMINATION:  CT CHEST ABDOMEN PELVIS WITH IV CONTRAST (XPD)    CLINICAL HISTORY:  large upper chest mass extending into R side of neck;    TECHNIQUE:  Multidetector axial images were obtained from the thoracic inlet through the greater trochanters following the administration of IV contrast.    Dose length product of 978 mGycm. Automated exposure control was utilized to minimize radiation dose.    COMPARISON:  None available.    CHEST FINDINGS:    The lungs are unremarkable without suspicious soft tissue pulmonary nodule, parenchyma consolidation, interstitial disease, pleural effusion or pneumothorax.  There are right lower lung lobe hypoventilatory changes.    There are right supraclavicular and infraclavicular enlarged lymph nodes.  Right lower neck large  necrotic  lymph node proceeds inferiorly towards the axillary region.  There are no mediastinal or hilar enlarged lymph nodes.  Thoracic aorta is without aneurysmal dilatation or dissection.  Cardiac chamber size is within normal limits.    ABDOMINAL FINDINGS:    Liver and spleen are not enlarged in size.  Right hepatic lobe small hypodensity is not characterized and may represent a cyst on image 50 series 2.  Gallbladder lumen is without calcified calculus and there is no thickening of the wall or pericholecystic fluid.  No apparent dilatation of ducts.  Pancreas is unremarkable.  Adrenals are normal size configuration.  Unremarkable enhancement of kidneys and the collecting systems are without dilatation.  There are no mesenteric or retroperitoneal periaortic enlarged lymph nodes.    Stomach is partially decompressed.  No abnormal dilatation of loops of small bowel and there is no focal or generalized mural thickening.  Colonic fecal loading without abnormal dilatation and there are no pericolonic acute strandings.  There is sigmoid colon noninflamed diverticulosis coli.  No bowel obstruction.  No free fluid.    PELVIC FINDINGS:    Urinary bladder wall is not thickened.  There is lobular contour of the uterus which may be related to fibroid.  Endometrium is not delineated.  No pelvic free fluid.  There are subcentimeter bilateral inguinal lymph nodes.    No acute or aggressive skeletal abnormality                                      Current Medications:     Infusions:  Current Facility-Administered Medications   Medication Dose Route Frequency Last Rate Last Admin    sodium chloride 0.9%   Intravenous Continuous 150 mL/hr at 04/23/24 0808 New Bag at 04/23/24 0808        Scheduled:  Current Facility-Administered Medications   Medication Dose Route Frequency    amLODIPine  5 mg Oral Daily    atorvastatin  20 mg Oral QHS    calcitonin  4 Units/kg Subcutaneous BID    enoxparin  40 mg Subcutaneous Daily    lisinopriL  10 mg  Oral Daily        PRN:    Current Facility-Administered Medications:     acetaminophen, 650 mg, Oral, Q4H PRN    cetirizine, 10 mg, Oral, Daily PRN    dextrose 10%, 12.5 g, Intravenous, PRN    dextrose 10%, 25 g, Intravenous, PRN    glucagon (human recombinant), 1 mg, Intramuscular, PRN    glucose, 16 g, Oral, PRN    glucose, 24 g, Oral, PRN    hydrALAZINE, 10 mg, Intravenous, Q2H PRN    insulin aspart U-100, 0-5 Units, Subcutaneous, QID (AC + HS) PRN    labetalol, 10 mg, Intravenous, Q4H PRN    melatonin, 6 mg, Oral, Nightly PRN    naloxone, 0.02 mg, Intravenous, PRN    ondansetron, 8 mg, Oral, Q8H PRN    polyethylene glycol, 17 g, Oral, Daily PRN    prochlorperazine, 5 mg, Intravenous, Q6H PRN    senna-docusate 8.6-50 mg, 1 tablet, Oral, BID PRN    simethicone, 1 tablet, Oral, TID PRN    sodium chloride 0.9%, 10 mL, Intravenous, PRN      Assessment:     Zoë Bone is a 69 y.o.female with  Patient Active Problem List    Diagnosis Date Noted    Neck swelling 04/22/2024        Plan:     Tobacco use disorder (quit 30 years ago), 20 pack-year history  Right facial and neck edema  Concern for squamous cell carcinoma  -afebrile; wbc wnl without left shift  -CRP 17.40 and ESR 28  -CT soft tissue neck (04/23/2024) showed bilateral neck lymphadenopathy more extensive on right compared to left with necrotic changes on right neck  -CT chest abdomen pelvis with and without contrast (04/22/2024) showed continuation of right necrotic lymph node involvement inferiorly to the right axilla but no mediastinal or hilar lymphadenopathy  -patient reports she was seen by PCP as outpatient and was told she had an abscess which was treated with an abx that she took twice daily (patient nor family remember name) however symptoms did not improve with abx therapy  -presentation concerning for malignancy likely squamous cell given significant smoking history (approximately 2 ppd x 40 years).  But given the aggressive nature lymphoma is also  high on differential.  - consulted IR for biopsy.  They will see the patient 4/24 and we will attempt biopsy 4/25 after evaluation.  -will plan to refer to hematology and oncology at discharge  -HIV negative  -patient tachycardic and tachypnic on admission however patient was also deeply emotional after being told results of imaging and concern for possible malignancy  -saturations stable  -tachypnea subsequently resolved with improvement in mood.  Tachycardia also resolved.  -EKG on admission showed sinus tachycardia with low voltage QRS in V1-V6  -patient already received IV contrast imaging while in        Hypercalcemia (improving)  Corrected calcium 14.02   -hypercalcemia likely 2/2 underlying malignancy  -given 2L LR while in ED  -denies any bone pain, any abdominal pains component, any urinary complaints, or change in mentation.  -initiated IV calcitonin 4 units/kg bid x 2 days  -patient was NS continuous flow 150 mL an hour.  -zoledronic acid 4 mg IV started.  -slight reduction and calcium.  -will continue to titrate calcitonin until hypercalcemia resolves.  Bisphosphonate we will likely take 2 days before response to treatment is seen.     Hypertension (controlled)  -restarted home lisinopril 10 mg qd at home  -initiated amlodipine 5 mg qd for additionally bp control  -continue prn hydralazine and labetalol per antihypertensive parameters  -will continue to titrate antihypertensives as indicated     Hyperlipidemia (being treated)  -on pravastatin 20 mg qd as outpatient  -pravastatin not on formulary  -initiated atorvastatin 20 mg qd     Diabetes mellitus type II (controlled)  -glucose 203 on admission  -goal blood glucose < 180  -on novolin 70/30 at home  -holding home insulin  -initiated LDSSI  -will continue to monitor and titrate insulin regimen as indicated     Code:  Full code  Diet:  Diabetic  DVT/GI PPX:  Lovenox  Dispo:  None    Kody Goldberg M.D  [facility] PGY-1  04/23/2024

## 2024-04-23 NOTE — PLAN OF CARE
Received call from Ashvin with [facility], P: [phone], stating that patient has been accepted.

## 2024-04-23 NOTE — CONSULTS
Consult for home health noted. Patient is in agreement with no preference. Referral packet and HH order have been sent to [facility] via [facility].

## 2024-04-23 NOTE — PLAN OF CARE
04/23/24 1517   Discharge Assessment   Assessment Type Discharge Planning Assessment   Confirmed/corrected address, phone number and insurance Yes   Confirmed Demographics Correct on Facesheet   Source of Information patient   When was your last doctors appointment?   (PCP: Doris Tobias)   Does patient/caregiver understand observation status Yes   Communicated PEDRO with patient/caregiver Date not available/Unable to determine   Reason For Admission Hypercalcemia; Chest pain; Necrotizing inflammation of lymph node   People in Home significant other   Facility Arrived From: Home   Do you expect to return to your current living situation? Yes   Do you have help at home or someone to help you manage your care at home? Yes   Who are your caregiver(s) and their phone number(s)? Reji Maradiaga, SO, P: [phone]; Mansi Rosen, daughter, P: [phone]   Prior to hospitilization cognitive status: Alert/Oriented;No Deficits   Current cognitive status: Alert/Oriented;No Deficits   Walking or Climbing Stairs Difficulty no   Dressing/Bathing Difficulty no   Home Accessibility not wheelchair accessible;stairs to enter home   Number of Stairs, Main Entrance four   Stair Railings, Main Entrance railings safe and in good condition;railings on both sides of stairs   Home Layout Able to live on 1st floor   Equipment Currently Used at Home blood pressure machine   Readmission within 30 days? No   Patient currently being followed by outpatient case management? No   Do you currently have service(s) that help you manage your care at home? No   Do you take prescription medications? Yes  ([facility] or [facility] in [place])   Do you have prescription coverage? Yes   Coverage [employer]   Do you have any problems affording any of your prescribed medications? No   Is the patient taking medications as prescribed? yes   Who is going to help you get home at discharge? Family   How do you get to doctors  appointments? family or friend will provide   Are you on dialysis? No   Do you take coumadin? No   Discharge Plan A Home with family;Home Health   DME Needed Upon Discharge  none   Discharge Plan discussed with: Patient   Transition of Care Barriers None   Physical Activity   On average, how many days per week do you engage in moderate to strenuous exercise (like a brisk walk)? 5 days   On average, how many minutes do you engage in exercise at this level? 30 min   Financial Resource Strain   How hard is it for you to pay for the very basics like food, housing, medical care, and heating? Somewhat   Housing Stability   In the last 12 months, was there a time when you were not able to pay the mortgage or rent on time? N   In the past 12 months, how many times have you moved where you were living? 1   At any time in the past 12 months, were you homeless or living in a shelter (including now)? N   Transportation Needs   In the past 12 months, has lack of transportation kept you from medical appointments or from getting medications? no   In the past 12 months, has lack of transportation kept you from meetings, work, or from getting things needed for daily living? No   Food Insecurity   Within the past 12 months, the food you bought just didn't last and you didn't have money to get more. Never true   Stress   Do you feel stress - tense, restless, nervous, or anxious, or unable to sleep at night because your mind is troubled all the time - these days? Only a littl   Social Connections   In a typical week, how many times do you talk on the phone with family, friends, or neighbors? More than 3   How often do you get together with friends or relatives? More than 3   How often do you attend Jewish or Latter-day services? More than 4   Do you belong to any clubs or organizations such as Jewish groups, unions, fraternal or athletic groups, or school groups? No   How often do you attend meetings of the clubs or organizations you  belong to? Never   Are you , , , , never , or living with a partner? Living with   Alcohol Use   Q1: How often do you have a drink containing alcohol? Never   Q2: How many drinks containing alcohol do you have on a typical day when you are drinking? None   Q3: How often do you have six or more drinks on one occasion? Never   OTHER   Name(s) of People in Home KIKE Nguyen, P: [phone]     Patient is independent with ADL's; receives VA 's benefits; CM will follow for DC planning needs.

## 2024-04-23 NOTE — PROGRESS NOTES
Inpatient Nutrition Evaluation    Admit Date: 4/22/2024   Total duration of encounter: 1 day   Patient Age: 69 y.o.    Nutrition Recommendation/Prescription     1800 kcal diabetic diet  Monitor Weight Biweekly     Nutrition Assessment     Chart Review    Reason Seen: continuous nutrition monitoring    Malnutrition Screening Tool Results   Have you recently lost weight without trying?: No  Have you been eating poorly because of a decreased appetite?: No   MST Score: 0   Diagnosis:  Right facial & neck edema Concern for squamous cell carcinoma, Hypercalcemia, HTN, HLD, DM    Relevant Medical History: Tobacco use, HTN, HLD, DM, Pernicious anemia, dental abscess s/p tooth extraction    Scheduled Medications:  amLODIPine, 5 mg, Daily  atorvastatin, 20 mg, QHS  calcitonin, 4 Units/kg, BID  enoxparin, 40 mg, Daily  lisinopriL, 10 mg, Daily  zoledronic acid, 4 mg, 1 time in [facility]    Continuous Infusions:  sodium chloride 0.9%, Last Rate: 150 mL/hr at 04/23/24 0808    PRN Medications:   Current Facility-Administered Medications:     acetaminophen, 650 mg, Oral, Q4H PRN    cetirizine, 10 mg, Oral, Daily PRN    dextrose 10%, 12.5 g, Intravenous, PRN    dextrose 10%, 25 g, Intravenous, PRN    glucagon (human recombinant), 1 mg, Intramuscular, PRN    glucose, 16 g, Oral, PRN    glucose, 24 g, Oral, PRN    hydrALAZINE, 10 mg, Intravenous, Q2H PRN    insulin aspart U-100, 0-5 Units, Subcutaneous, QID (AC + HS) PRN    labetalol, 10 mg, Intravenous, Q4H PRN    melatonin, 6 mg, Oral, Nightly PRN    naloxone, 0.02 mg, Intravenous, PRN    ondansetron, 8 mg, Oral, Q8H PRN    polyethylene glycol, 17 g, Oral, Daily PRN    prochlorperazine, 5 mg, Intravenous, Q6H PRN    senna-docusate 8.6-50 mg, 1 tablet, Oral, BID PRN    simethicone, 1 tablet, Oral, TID PRN    sodium chloride 0.9%, 10 mL, Intravenous, PRN    Recent Labs   Lab 04/22/24  1832 04/22/24  1833 04/23/24  0430   *  --  137   K 4.4  --  4.2   CALCIUM 13.7*  --  12.2*  "  PHOS  --   --  2.0*   MG  --   --  1.40*   CHLORIDE 100  --  104   CO2 25  --  27   BUN 14.1  --  7.5*   CREATININE 0.71  --  0.57   EGFRNORACEVR >60  --  >60   GLUCOSE 205*  --  164*   BILITOT 0.5  --  0.5   ALKPHOS 57  --  52   ALT 13  --  12   AST 15  --  15   ALBUMIN 3.6  --  3.2*   CRP  --  17.40*  --    WBC 9.27  --  7.97   HGB 13.4  --  12.4   HCT 39.1  --  36.2*     Nutrition Orders:  Diet diabetic 1500 Calorie      Appetite/Oral Intake: good/NPO  Factors Affecting Nutritional Intake: none identified  Food/Worship/Cultural Preferences: none reported  Food Allergies: no known food allergies  Last Bowel Movement: 24  Wound(s):  skin intact    Comments    24 -- Pt NPO at time of visit for possible biopsy, reports good appetite; denies difficulty chewing or swallowing; denies weight loss reporting UBW ~166 lb, current weight verified via bed scale; Glu (H) -- h/o DM, suggest 1800 kcal diabetic diet at discharge; no food insecurities identified    Anthropometrics    Height: 5' 2" (157.5 cm), Height Method: Stated  Last Weight: 75.5 kg (166 lb 6.4 oz) (24 1008), Weight Method: Bed Scale  BMI (Calculated): 30.4  BMI Classification: obese grade I (BMI 30-34.9)     Ideal Body Weight (IBW), Female: 110 lb     % Ideal Body Weight, Female (lb): 133.88 %                    Usual Body Weight (UBW), k.5 kg  % Usual Body Weight: 100.18  % Weight Change From Usual Weight: -0.03 %  Usual Weight Provided By: patient    Wt Readings from Last 5 Encounters:   24 75.5 kg (166 lb 6.4 oz)     Weight Change(s) Since Admission: ? Accuracy of documented admit weight, will monitor  Wt Readings from Last 1 Encounters:   24 1008 75.5 kg (166 lb 6.4 oz)   24 1810 66.8 kg (147 lb 4.3 oz)   Admit Weight: 66.8 kg (147 lb 4.3 oz) (24 1810), Weight Method: Standard Scale    Patient Education     Not applicable.    Nutrition Goals & Monitoring     Dietitian will monitor: food and beverage intake, " weight change, and glucose/endocrine profile    Nutrition Risk/Follow-Up: low (follow-up in 5-7 days)  Patients assigned 'low nutrition risk' status do not qualify for a full nutritional assessment but will be monitored and re-evaluated in a 5-7 day time period. Please consult if re-evaluation needed sooner.

## 2024-04-23 NOTE — CONSULTS
OTOLARYNGOLOGY CONSULT NOTE    SERVICE: [facility] Otolaryngology  RESIDENT PHYSICIANS: Anoop Mathew MD PGY5  ATTENDING PHYSICIAN: Arron Truong MD    REASON FOR CONSULT: neck mass  CHIEF COMPLAINT:   Chief Complaint   Patient presents with    Facial Swelling     Significant right sided facial and neck swelling x6 weeks. Denies trouble breathing or swallowing.          HISTORY OF PRESENT ILLNESS  Zoë Bone is a 69 y.o. female with PMH HTN, HLD, T2DM, pernicious anemia, tobacco use qho quit in 2014 who presents with enlarging right neck mass for 6 weeks.  Reports she was diagnosed with a dental infection a little over a month ago, was given antibiotics.  She had some improvement initially, but the swelling subsequently came back.  She was found to be hypertensive, tachycardic, and tachypnic in ED with calcium level >13.  She was admitted for management of neck mass and hypercalcemia.    No dysphagia, odynophagia, sore throat, otalgia. Never had anything like this before.  Denies any drainage from the skin      REVIEW OF SYSTEMS:  Review of Systems   All other systems reviewed and are negative.      As above and otherwise negative X 10-point review.    PAST MEDICAL HISTORY  No past medical history on file.    PAST SURGICAL HISTORY  No past surgical history on file.    SOCIAL HISTORY  Social History     Socioeconomic History    Marital status:        ALLERGIES  Review of patient's allergies indicates:   Allergen Reactions    Codeine     Lortab [hydrocodone-acetaminophen] Hallucinations    Tramadol        FAMILY HISTORY  No family history on file.    Meds  Scheduled Meds:  Current Facility-Administered Medications   Medication Dose Route Frequency    amLODIPine  5 mg Oral Daily    atorvastatin  20 mg Oral QHS    calcitonin  4 Units/kg Subcutaneous BID    enoxparin  40 mg Subcutaneous Daily    lisinopriL  10 mg Oral Daily     Continuous Infusions:  Current Facility-Administered Medications   Medication Dose  Route Frequency Last Rate Last Admin    sodium chloride 0.9%   Intravenous Continuous 150 mL/hr at 04/23/24 0808 New Bag at 04/23/24 0808     PRN Meds:   Current Facility-Administered Medications:     acetaminophen, 650 mg, Oral, Q4H PRN    cetirizine, 10 mg, Oral, Daily PRN    dextrose 10%, 12.5 g, Intravenous, PRN    dextrose 10%, 25 g, Intravenous, PRN    glucagon (human recombinant), 1 mg, Intramuscular, PRN    glucose, 16 g, Oral, PRN    glucose, 24 g, Oral, PRN    hydrALAZINE, 10 mg, Intravenous, Q2H PRN    insulin aspart U-100, 0-5 Units, Subcutaneous, QID (AC + HS) PRN    labetalol, 10 mg, Intravenous, Q4H PRN    melatonin, 6 mg, Oral, Nightly PRN    naloxone, 0.02 mg, Intravenous, PRN    ondansetron, 8 mg, Oral, Q8H PRN    polyethylene glycol, 17 g, Oral, Daily PRN    prochlorperazine, 5 mg, Intravenous, Q6H PRN    senna-docusate 8.6-50 mg, 1 tablet, Oral, BID PRN    simethicone, 1 tablet, Oral, TID PRN    sodium chloride 0.9%, 10 mL, Intravenous, PRN    Physical Exam  GENERAL: NAD, AAO, no dyspnea/inc WOB, no stridor, tolerates secretions   NEURO: CN II - XII exam: intact bilaterally   EYES: EOMI, PERRLA  EARS: Hearing well at normal conversation volume  NOSE: nares normal, septum midline, MMM, no drainage or sinus tenderness, no polyps, no crusting or bleeding points   ORAL CAVITY: MMM, no lesions or ulcerations, no leukoplakia, no edema; BOT and FOM are soft/NT and without lesions/ ulcerations/ leukoplakia; dentitions is very poor  OROPHARYNX: No uvular deviation or deviation of the oropharyngeal wall noted, no erythema/ petechia/ clots; No effacement of the palatopharyngeal and/or palatoglossal folds. No fluctuance; tonsils are symmetric and 1+ without erythema/ exudate  VOICE: communicates effectively via voicing which is normal  NECK: very large conglomerate of lymph nodes from preauricular region over the mandible and to the base of neck on right side, nontender to palpation, some violaceous hue skin  changes overlying the mass in several spots  CARDIOVASCULAR: peripheral pulses palpable  RESPIRATORY: non-labored respirations with symmetric chest rise  ABDOMEN: s/nt/nd  EXT: moving with coordination  PSYCHIATRIC: orientation to time/place/person, no depression, no anxiety or agitation, mood and affect wnl    Flexible Fiberoptic Laryngoscopy/Nasopharyngoscopy:  Anesthesia: none  Surgeon: Anoop Mathew MD  Indication: neck mass    Procedure in Detail: Verbal consent was obtain form patient. Flexible laryngoscopy was performed on Zoë Bone through the nasal passages. The nasal cavity, nasopharynx, oropharynx, hypopharynx and larynx were adequately visualized. The true vocal cords and arytenoids were examined during phonation and repose. Patient tolerated procedure well without complications.     Findings  NP/OP: no masses/lesions of NC, eustachian tube, fossa of Rosenmuller, no adenoid hypertrophy  BOT/vallecula: no lingual hypertrophy, no masses/lesions, no secretions obscuring visualization  Piriform sinuses/post-cricoid: no masses/lesions, no pooling of secretions  Epiglottis: lingual and laryngeal surfaces within normal limits  Arytenoids/FVFs: no masses/lesions, no edema, bilateral mobility  TVCs: bilateral cord mobility, no masses or lesions  No aspiration, no pooled secretions  No sign of malignancy      LABORATORY RESULTS  Lab Results   Component Value Date/Time    WBC 7.97 04/23/2024 04:30 AM    HGB 12.4 04/23/2024 04:30 AM    HCT 36.2 (L) 04/23/2024 04:30 AM    CHLORIDE 104 04/23/2024 04:30 AM    CO2 27 04/23/2024 04:30 AM    BUN 7.5 (L) 04/23/2024 04:30 AM    CREATININE 0.57 04/23/2024 04:30 AM    GLUCOSE 164 (H) 04/23/2024 04:30 AM    CALCIUM 12.2 (HH) 04/23/2024 04:30 AM    ALBUMIN 3.2 (L) 04/23/2024 04:30 AM    AST 15 04/23/2024 04:30 AM    ALT 12 04/23/2024 04:30 AM    ALKPHOS 52 04/23/2024 04:30 AM       RADIOLOGY (I personally reviewed the images)    ? CT Scan:  Large diffuse lymphadenopathy of  the right parotid and deep neck spaces        A/P:  Zoë Bone is a 69 y.o. female with right parotid/neck lymphadenopathy concerning for malignancy, possibly lymphoma    - CT imaging reviewed  - Recommend IR biopsy  - Hypercalcemia management per primary team  - Agree with PTH related peptide, would also get regular PTH level  - Recommend TB testing as patient has some violaceous skin changes overlying the mass  - Will follow up outpatient, please call for any questions or concerns    Thank you for allowing us to participate in the care of this patient.    Anoop Mathew MD  [facility] Department of Otolaryngology  [facility]

## 2024-04-23 NOTE — H&P
"[facility] Internal Medicine Wards History & Physical Note    Resident Team: [facility] Medicine List 4  Attending Physician: Vero Adler MD  Resident: Syed Vasquez DO  Intern: Rakesh Seals MD    *Important note: This note was created with the use of dictation and thereby may include syntax, grammatical, verbiage, and spelling errors, among others. Please see patient chart and/or feel free to contact me with any questions or confusions needing clarification.*    Subjective:      History of Present Illness:  Zoë Bone is a 69 y.o.  female with PMH of tobacco use disorder (quit approximately 2014), hypertension, hyperlipidemia, diabetes mellitus type II, pernicious anemia, dental abscess s/p tooth extraction, who presented to [facility] ED (4/22/2024) due to right sided neck swelling. Patient states that symptoms began approximately 1 month ago at which time patient developed right  sided facial swelling. Patient suspected she had a dental abscess as patient has had a dental abscess in the past requiring tooth extraction (roughly 10-15 years ago). She presented to her PCP for evaluation who patient states told her "she had an abscess" at which time she was prescribed an antibiotic which patient does not recall. Patient adhered to antibiotic regimen but swelling continued to worsen such that swelling began to extend to the right neck from the right face prompting patient to present to [facility] ED for evaluation. She denies noting any overlying redness or rashes nor pain to the affected area. Denies fever, chills, sweats. Denies chest pain, cough, hemoptysis, shortness of breath. Denies abdominal pain, decreased appetite, involuntary weight loss, nausea, vomiting, hematemesis, constipation, diarrhea, hematochezia, melena. Denies increased or decreased urinary frequency, dysuria, hematuria. Denies lower extremity swelling, pain, rashes.    ED course: Vitals show patient hypertensive, tachycardic, and tachypnic but " saturations stable on room air. CBC unremarkable. CRP 17.40. ESR 28. CMP showed borderline severe hypercalcemia with calcium 13.7 and hyponatremia with mild sodium 132. CT soft tissue neck (04/23/2024) showed bilateral neck lymphadenopathy more extensive on right compared to left with necrotic changes on right neck. CT chest abdomen pelvis with and without contrast (04/22/2024) showed continuation of right necrotic lymph node involvement inferiorly to the right axilla but no mediastinal or hilar lymphadenopathy. Internal medicine consulted for evaluation and management of right sided head and neck swelling concerning for underlying malignancy.     Past Medical History:  No past medical history on file.  Past Surgical History:  No past surgical history on file.  Family History:  No family history on file.  Social History:     Allergies:  Review of patient's allergies indicates:   Allergen Reactions    Codeine     Lortab [hydrocodone-acetaminophen] Hallucinations    Tramadol      Home Medications:  Prior to Admission medications    Not on File     Review of Systems:  Review of Systems   Constitutional:  Negative for chills, diaphoresis, fatigue and fever.   HENT:  Negative for ear pain, hearing loss, rhinorrhea, sore throat, tinnitus and trouble swallowing.    Eyes:  Negative for pain, redness and visual disturbance.   Respiratory:  Negative for cough, chest tightness and shortness of breath.    Cardiovascular:  Negative for chest pain and palpitations.   Gastrointestinal:  Negative for abdominal pain, constipation, diarrhea, nausea and vomiting.   Genitourinary:  Negative for difficulty urinating, dysuria, frequency, hematuria and urgency.   Musculoskeletal:  Negative for arthralgias and myalgias.        Neck swelling   Skin:  Negative for rash and wound.   Neurological:  Negative for dizziness, seizures, syncope, weakness, light-headedness, numbness and headaches.   Psychiatric/Behavioral:  Negative for confusion.   "       Objective:   Last 24 Hour Vital Signs:  BP  Min: 145/91  Max: 173/88  Temp  Av.4 °F (36.9 °C)  Min: 97.7 °F (36.5 °C)  Max: 99 °F (37.2 °C)  Pulse  Av.6  Min: 107  Max: 117  Resp  Av  Min: 18  Max: 26  SpO2  Av %  Min: 93 %  Max: 97 %  Height  Av' 2" (157.5 cm)  Min: 5' 2" (157.5 cm)  Max: 5' 2" (157.5 cm)  Weight  Av.8 kg (147 lb 4.3 oz)  Min: 66.8 kg (147 lb 4.3 oz)  Max: 66.8 kg (147 lb 4.3 oz)  Body mass index is 26.94 kg/m².  No intake/output data recorded.    Physical Examination:  Physical Exam  Vitals reviewed.   Constitutional:       General: She is in acute distress.      Appearance: Normal appearance. She is normal weight. She is not ill-appearing, toxic-appearing or diaphoretic.   HENT:      Head: Normocephalic and atraumatic.      Comments: Right face and neck edematous and indurated spanning from right pre and post auricular area down to right clavicle  Excoriations noted to base of right neck      Right Ear: External ear normal.      Left Ear: External ear normal.   Eyes:      General: No scleral icterus.        Right eye: No discharge.         Left eye: No discharge.      Extraocular Movements: Extraocular movements intact.      Conjunctiva/sclera: Conjunctivae normal.      Pupils: Pupils are equal, round, and reactive to light.   Cardiovascular:      Rate and Rhythm: Normal rate and regular rhythm.      Pulses: Normal pulses.      Heart sounds: Normal heart sounds. No murmur heard.     No friction rub. No gallop.   Pulmonary:      Effort: Pulmonary effort is normal. No respiratory distress.      Breath sounds: Normal breath sounds. No wheezing, rhonchi or rales.   Abdominal:      General: Abdomen is flat. Bowel sounds are normal. There is no distension.      Palpations: Abdomen is soft.      Tenderness: There is no abdominal tenderness. There is no guarding.   Musculoskeletal:         General: No swelling, tenderness, deformity or signs of injury. Normal range of " "motion.      Right lower leg: No edema.      Left lower leg: No edema.   Skin:     General: Skin is warm and dry.      Capillary Refill: Capillary refill takes less than 2 seconds.      Coloration: Skin is not jaundiced.      Findings: No bruising, erythema or rash.   Neurological:      Mental Status: She is alert.      Comments: AAO to person, place, time, and situation; CN II-XII grossly intact          Media Information    File Link    Scan on 4/22/2024  9:30 PM by Rakesh Seals MD: Right neck swelling        Key Information    Document ID File Type Document Type Description   [id number] Image Photographs Right neck swelling     Import Information    Attached At Date Time User Dept   Encounter Level 4/22/2024  9:30 PM Rakesh Seals MD [facility] Emergency Department     Encounter    Hospital Encounter on 4/22/24       Laboratory:  Most Recent Data:  CBC:   Lab Results   Component Value Date    WBC 9.27 04/22/2024    HGB 13.4 04/22/2024    HCT 39.1 04/22/2024     (H) 04/22/2024    MCV 87.7 04/22/2024    RDW 12.6 04/22/2024     WBC Differential:   Recent Labs   Lab 04/22/24  1832   WBC 9.27   HGB 13.4   HCT 39.1   *   MCV 87.7     BMP:   Lab Results   Component Value Date     (L) 04/22/2024    K 4.4 04/22/2024    CO2 25 04/22/2024    BUN 14.1 04/22/2024    CREATININE 0.71 04/22/2024    CALCIUM 13.7 (HH) 04/22/2024     LFTs:   Lab Results   Component Value Date    ALBUMIN 3.6 04/22/2024    BILITOT 0.5 04/22/2024    AST 15 04/22/2024    ALKPHOS 57 04/22/2024    ALT 13 04/22/2024     Coags: No results found for: "INR", "PROTIME", "PTT"  FLP: No results found for: "CHOL", "HDL", "LDLCALC", "TRIG", "CHOLHDL"  DM:   Lab Results   Component Value Date    CREATININE 0.71 04/22/2024     Thyroid:   Lab Results   Component Value Date    TSH 0.703 04/22/2024     Anemia: No results found for: "IRON", "TIBC", "FERRITIN", "[id number]", "FOLATE"  Cardiac: No results found for: "TROPONINI", "CKTOTAL", " ""CKMB", "BNP"  Urinalysis:   Lab Results   Component Value Date    LABURIN No Growth 06/27/2023     Trended Lab Data:  Recent Labs   Lab 04/22/24  1832   WBC 9.27   HGB 13.4   HCT 39.1   *   MCV 87.7   RDW 12.6   *   K 4.4   CO2 25   BUN 14.1   CREATININE 0.71   ALBUMIN 3.6   BILITOT 0.5   AST 15   ALKPHOS 57   ALT 13     Trended Cardiac Data:  No results for input(s): "TROPONINI", "CKTOTAL", "CKMB", "BNP" in the last 168 hours.  Radiology:  Imaging Results              CT Soft Tissue Neck With Contrast (Final result)  Result time 04/22/24 20:47:44      Final result by Hilton Ricks MD (04/22/24 20:47:44)                   Impression:      Bilateral neck lymphadenopathy which is extensive on the right with central necrotic changes.  Findings may represent lymphoma leukemia leukemia or metastatic pathology.      Electronically signed by: Hilton Ricks  Date:    04/22/2024  Time:    20:47               Narrative:    EXAMINATION:  CT SOFT TISSUE NECK WITH CONTRAST    CLINICAL HISTORY:  large R sided neck mass extending into chest;    TECHNIQUE:  Multidetector axial images were performed of the neck following intravenous contrast administration and the images were reformatted.    Dose length product was 978 mGycm. Automated radiation control was utilized to minimize radiation dose.    COMPARISON:  None available    FINDINGS:  There are right neck intraparotid, periparotid, submandibular, jugulodigastric, deep cervical and lower neck numerous variable size masses consistent with lymphadenopathy.  Reference right jugulodigastric enlarged lymph node is 2.4 x 2.2 cm on image 39 series 2.  Two adjacent large centrally necrotic lymph nodes within the lower neck measure 3.7 x 4.5 cm and 3.5 x 3.7 cm on image 54 series 2.  There are also mildly enlarged left neck lymph nodes.    There are no parapharyngeal including fossa of Rosenmuller inflammatory changes. The epiglottis is not thickened and there are no " findings of the larynx and the trachea.There is no asymmetric fullness or inflammatory changes of the aryepiglottic folds.                                       CT Chest Abdomen Pelvis With IV Contrast (XPD) Routine Oral Contrast (Final result)  Result time 04/22/24 20:55:14      Final result by Hilton Ricks MD (04/22/24 20:55:14)                   Impression:      1.  Right lower neck large necrotic lymph node proceeds inferiorly to the axillary region.  Additional right supraclavicular and infraclavicular lymph nodes.  No mediastinal or hilar lymphadenopathy.    2.  No chest acute infiltrates or effusions.    3.  No abdominopelvic visceral acute findings or lymphadenopathy.  Details of the findings above.      Electronically signed by: Hilton Ricks  Date:    04/22/2024  Time:    20:55               Narrative:    EXAMINATION:  CT CHEST ABDOMEN PELVIS WITH IV CONTRAST (XPD)    CLINICAL HISTORY:  large upper chest mass extending into R side of neck;    TECHNIQUE:  Multidetector axial images were obtained from the thoracic inlet through the greater trochanters following the administration of IV contrast.    Dose length product of 978 mGycm. Automated exposure control was utilized to minimize radiation dose.    COMPARISON:  None available.    CHEST FINDINGS:    The lungs are unremarkable without suspicious soft tissue pulmonary nodule, parenchyma consolidation, interstitial disease, pleural effusion or pneumothorax.  There are right lower lung lobe hypoventilatory changes.    There are right supraclavicular and infraclavicular enlarged lymph nodes.  Right lower neck large  necrotic lymph node proceeds inferiorly towards the axillary region.  There are no mediastinal or hilar enlarged lymph nodes.  Thoracic aorta is without aneurysmal dilatation or dissection.  Cardiac chamber size is within normal limits.    ABDOMINAL FINDINGS:    Liver and spleen are not enlarged in size.  Right hepatic lobe small hypodensity is  not characterized and may represent a cyst on image 50 series 2.  Gallbladder lumen is without calcified calculus and there is no thickening of the wall or pericholecystic fluid.  No apparent dilatation of ducts.  Pancreas is unremarkable.  Adrenals are normal size configuration.  Unremarkable enhancement of kidneys and the collecting systems are without dilatation.  There are no mesenteric or retroperitoneal periaortic enlarged lymph nodes.    Stomach is partially decompressed.  No abnormal dilatation of loops of small bowel and there is no focal or generalized mural thickening.  Colonic fecal loading without abnormal dilatation and there are no pericolonic acute strandings.  There is sigmoid colon noninflamed diverticulosis coli.  No bowel obstruction.  No free fluid.    PELVIC FINDINGS:    Urinary bladder wall is not thickened.  There is lobular contour of the uterus which may be related to fibroid.  Endometrium is not delineated.  No pelvic free fluid.  There are subcentimeter bilateral inguinal lymph nodes.    No acute or aggressive skeletal abnormality                                       Assessment & Plan:     Tobacco use disorder (quit approximately 2014)  Right facial and neck edema  Concern for squamous cell carcinoma  -afebrile; wbc wnl without left shift  -CRP 17.40 and ESR 28  -CT soft tissue neck (04/23/2024) showed bilateral neck lymphadenopathy more extensive on right compared to left with necrotic changes on right neck  -CT chest abdomen pelvis with and without contrast (04/22/2024) showed continuation of right necrotic lymph node involvement inferiorly to the right axilla but no mediastinal or hilar lymphadenopathy  -patient reports she was seen by PCP as outpatient and was told she had an abscess which was treated with an abx that she took twice daily (patient nor family remember name) however symptoms did not improve with abx therapy  -presentation concerning for malignancy likely squamous cell  given significant smoking history (approximately 2 ppd x 40 years)  -will plan consult IR for biopsy in a.m.  -will plan refer to hematology and oncology if biopsy results deem appropriate    Tachycardia  Tachypnea  Additional MDM:   PERC Rule:   Age is greater than or equal to 50 = 1.0  Heart Rate is greater than or equal to 100 = 1.0  SaO2 on room air < 95% = 0.0  Unilateral leg swelling = 0.0  Hemoptysis = 0.0  Recent surgery or trauma = 0.0  Prior PE or DVT =  0.0  Hormone use = 0.00  PERC Score = 2        Well's Criteria Score:  -Clinical symptoms of DVT (leg swelling, pain with palpation) = 0.0  -PE is #1 diagnosis OR equally likely =            0.0  -Heart Rate >100 =   1.5  -Immobilization (= or > than 3 days) or surgery in the previous 4 weeks = 0.0  -Previous DVT/PE = 0.0  -Hemoptysis =          0.0  -Malignancy =           1.0  Well's Probability Score =    2.5      -see physical exam  -patient tachycardic and tachypnic on admission however patient was also deeply emotional after being told results of imaging and concern for possible malignancy  -saturations stable  -tachypnea subsequently resolved with improvement in mood however patient remains tachycardic  -EKG on admission showed sinus tachycardia with low voltage QRS in V1-V6  -patient already received IV contrast imaging while in ED and is not able to undergo repeat contrast imaging at this time  -will consider obtaining CT for PE in a.m. if tachycardia persists    Hypercalcemia  Corrected calcium 14.02   -hypercalcemia likely 2/2 underlying malignancy  -given 2L LR while in ED  -initiated IV calcitonin 4 units/kg bid x 2 days  -will continue to monitor calcium levels via lab work  -will continue to titrate calcitonin until hypercalcemia resolved    Hypertension  -vitals show patient hypertensive  -restarted home lisinopril 10 mg qd at home  -initiated amlodipine 5 mg qd for additionally bp control  -continue prn hydralazine and labetalol per  antihypertensive parameters  -will continue to titrate antihypertensives as indicated    Hyperlipidemia  -on pravastatin 20 mg qd as outpatient  -pravastatin not on formulary  -initiated atorvastatin 20 mg qd    Diabetes mellitus type II  -glucose 203 on admission  -goal blood glucose < 180  -on novolin 70/30 at home  -holding home insulin  -initiated LDSSI  -will continue to monitor and titrate insulin regimen as indicated    Disposition: Admit to internal medicine service for work up and management of neck swelling.    Rakesh Seals MD  [facility], PGY-1

## 2024-04-24 LAB
ALBUMIN % SPEP (OHS): 41.78
ALBUMIN SERPL-MCNC: 2.9 G/DL (ref 3.4–4.8)
ALBUMIN SERPL-MCNC: 3.1 G/DL (ref 3.4–4.8)
ALBUMIN/GLOB SERPL: 0.7 RATIO (ref 1.1–2)
ALBUMIN/GLOB SERPL: 0.8 RATIO (ref 1.1–2)
ALP SERPL-CCNC: 49 UNIT/L (ref 40–150)
ALPHA 1 GLOB (OHS): 0.36 GM/DL
ALPHA 1 GLOB% (OHS): 5.09
ALPHA 2 GLOB % (OHS): 12.95
ALPHA 2 GLOB (OHS): 0.91 GM/DL
ALT SERPL-CCNC: 10 UNIT/L (ref 0–55)
AST SERPL-CCNC: 12 UNIT/L (ref 5–34)
BASOPHILS # BLD AUTO: 0.02 X10(3)/MCL
BASOPHILS NFR BLD AUTO: 0.2 %
BETA GLOB (OHS): 1.25 GM/DL
BETA GLOB% (OHS): 17.89
BILIRUB SERPL-MCNC: 0.6 MG/DL
BUN SERPL-MCNC: 4.7 MG/DL (ref 9.8–20.1)
CALCIUM SERPL-MCNC: 10.7 MG/DL (ref 8.4–10.2)
CHLORIDE SERPL-SCNC: 107 MMOL/L (ref 98–107)
CMV DNA SERPL NAA+PROBE-ACNC: NORMAL IU/ML
CMV IGG SERPL QL IA: POSITIVE
CMV IGM SERPL QL IA: NEGATIVE
CO2 SERPL-SCNC: 21 MMOL/L (ref 23–31)
CREAT SERPL-MCNC: 0.58 MG/DL (ref 0.55–1.02)
EBV NA AB SER QL: POSITIVE
EBV VCA IGG SER QL: POSITIVE
EBV VCA IGM SER QL: NEGATIVE
EOSINOPHIL # BLD AUTO: 0.03 X10(3)/MCL (ref 0–0.9)
EOSINOPHIL NFR BLD AUTO: 0.3 %
ERYTHROCYTE [DISTWIDTH] IN BLOOD BY AUTOMATED COUNT: 12.7 % (ref 11.5–17)
GAMMA GLOBULIN % (OHS): 22.28
GAMMA GLOBULIN (OHS): 1.56 GM/DL
GFR SERPLBLD CREATININE-BSD FMLA CKD-EPI: >60 MLS/MIN/1.73/M2
GLOBULIN SER-MCNC: 4.1 GM/DL (ref 2.4–3.5)
GLOBULIN SER-MCNC: 4.1 GM/DL (ref 2.4–3.5)
GLUCOSE SERPL-MCNC: 145 MG/DL (ref 82–115)
HCT VFR BLD AUTO: 34.9 % (ref 37–47)
HGB BLD-MCNC: 11.8 G/DL (ref 12–16)
HOLD SPECIMEN: NORMAL
IMM GRANULOCYTES # BLD AUTO: 0.02 X10(3)/MCL (ref 0–0.04)
IMM GRANULOCYTES NFR BLD AUTO: 0.2 %
IMMUNOLOGIST REVIEW: NORMAL
KAPPA LC FREE SER-MCNC: 2.02 MG/DL (ref 0.33–1.94)
KAPPA LC FREE/LAMBDA FREE SER: 0.96 {RATIO} (ref 0.26–1.65)
LAMBDA LC FREE SERPL-MCNC: 2.1 MG/DL (ref 0.57–2.63)
LYMPHOCYTES # BLD AUTO: 1.61 X10(3)/MCL (ref 0.6–4.6)
LYMPHOCYTES NFR BLD AUTO: 18.7 %
M SPIKE % (OHS): ABNORMAL
M SPIKE (OHS): ABNORMAL
MAGNESIUM SERPL-MCNC: 1.3 MG/DL (ref 1.6–2.6)
MCH RBC QN AUTO: 29.6 PG (ref 27–31)
MCHC RBC AUTO-ENTMCNC: 33.8 G/DL (ref 33–36)
MCV RBC AUTO: 87.5 FL (ref 80–94)
MONOCYTES # BLD AUTO: 0.46 X10(3)/MCL (ref 0.1–1.3)
MONOCYTES NFR BLD AUTO: 5.4 %
NEUTROPHILS # BLD AUTO: 6.45 X10(3)/MCL (ref 2.1–9.2)
NEUTROPHILS NFR BLD AUTO: 75.2 %
NRBC BLD AUTO-RTO: 0 %
OHS QRS DURATION: 74 MS
OHS QTC CALCULATION: 395 MS
PATH REV: NORMAL
PHOSPHATE SERPL-MCNC: 1.2 MG/DL (ref 2.3–4.7)
PLATELET # BLD AUTO: 382 X10(3)/MCL (ref 130–400)
PMV BLD AUTO: 8.8 FL (ref 7.4–10.4)
POCT GLUCOSE: 130 MG/DL (ref 70–110)
POCT GLUCOSE: 170 MG/DL (ref 70–110)
POCT GLUCOSE: 172 MG/DL (ref 70–110)
POCT GLUCOSE: 181 MG/DL (ref 70–110)
POTASSIUM SERPL-SCNC: 4.3 MMOL/L (ref 3.5–5.1)
PROT SERPL-MCNC: 7 GM/DL (ref 5.8–7.6)
PROT SERPL-MCNC: 7.2 GM/DL (ref 5.8–7.6)
RBC # BLD AUTO: 3.99 X10(6)/MCL (ref 4.2–5.4)
SODIUM SERPL-SCNC: 134 MMOL/L (ref 136–145)
WBC # SPEC AUTO: 8.59 X10(3)/MCL (ref 4.5–11.5)

## 2024-04-24 PROCEDURE — 25000003 PHARM REV CODE 250

## 2024-04-24 PROCEDURE — 07913ZX DRAINAGE OF RIGHT NECK LYMPHATIC, PERCUTANEOUS APPROACH, DIAGNOSTIC: ICD-10-PCS | Performed by: RADIOLOGY

## 2024-04-24 PROCEDURE — 86480 TB TEST CELL IMMUN MEASURE: CPT

## 2024-04-24 PROCEDURE — 21400001 HC TELEMETRY ROOM

## 2024-04-24 PROCEDURE — 36415 COLL VENOUS BLD VENIPUNCTURE: CPT

## 2024-04-24 PROCEDURE — 63600175 PHARM REV CODE 636 W HCPCS

## 2024-04-24 PROCEDURE — 85025 COMPLETE CBC W/AUTO DIFF WBC: CPT

## 2024-04-24 PROCEDURE — 84100 ASSAY OF PHOSPHORUS: CPT

## 2024-04-24 PROCEDURE — 83735 ASSAY OF MAGNESIUM: CPT

## 2024-04-24 PROCEDURE — 07B13ZX EXCISION OF RIGHT NECK LYMPHATIC, PERCUTANEOUS APPROACH, DIAGNOSTIC: ICD-10-PCS | Performed by: RADIOLOGY

## 2024-04-24 PROCEDURE — 63600175 PHARM REV CODE 636 W HCPCS: Mod: JG

## 2024-04-24 PROCEDURE — 96361 HYDRATE IV INFUSION ADD-ON: CPT

## 2024-04-24 PROCEDURE — 80053 COMPREHEN METABOLIC PANEL: CPT

## 2024-04-24 RX ORDER — INSULIN ASPART 100 [IU]/ML
5 INJECTION, SOLUTION INTRAVENOUS; SUBCUTANEOUS
Status: DISCONTINUED | OUTPATIENT
Start: 2024-04-24 | End: 2024-04-25 | Stop reason: HOSPADM

## 2024-04-24 RX ORDER — INSULIN ASPART 100 [IU]/ML
10 INJECTION, SOLUTION INTRAVENOUS; SUBCUTANEOUS
Status: DISCONTINUED | OUTPATIENT
Start: 2024-04-24 | End: 2024-04-24

## 2024-04-24 RX ADMIN — SODIUM CHLORIDE: 9 INJECTION, SOLUTION INTRAVENOUS at 06:04

## 2024-04-24 RX ADMIN — LISINOPRIL 10 MG: 10 TABLET ORAL at 09:04

## 2024-04-24 RX ADMIN — AMLODIPINE BESYLATE 5 MG: 5 TABLET ORAL at 09:04

## 2024-04-24 RX ADMIN — INSULIN ASPART 5 UNITS: 100 INJECTION, SOLUTION INTRAVENOUS; SUBCUTANEOUS at 05:04

## 2024-04-24 RX ADMIN — ACETAMINOPHEN 650 MG: 325 TABLET, FILM COATED ORAL at 08:04

## 2024-04-24 RX ADMIN — ENOXAPARIN SODIUM 40 MG: 40 INJECTION SUBCUTANEOUS at 04:04

## 2024-04-24 RX ADMIN — OXYCODONE HYDROCHLORIDE 5 MG: 5 TABLET ORAL at 06:04

## 2024-04-24 RX ADMIN — CALCITONIN SALMON 268 UNITS: 200 INJECTION, SOLUTION INTRAMUSCULAR; SUBCUTANEOUS at 09:04

## 2024-04-24 RX ADMIN — ATORVASTATIN CALCIUM 20 MG: 20 TABLET, FILM COATED ORAL at 08:04

## 2024-04-24 RX ADMIN — OXYCODONE HYDROCHLORIDE 5 MG: 5 TABLET ORAL at 05:04

## 2024-04-24 NOTE — PLAN OF CARE
Problem: Adult Inpatient Plan of Care  Goal: Plan of Care Review  Outcome: Progressing  Goal: Patient-Specific Goal (Individualized)  Outcome: Progressing  Goal: Readiness for Transition of Care  Outcome: Progressing

## 2024-04-24 NOTE — PROGRESS NOTES
"[facility] Internal Medicine Progress Note    Subjective:   Zoë Bone is a 69 y.o.  female with PMH of tobacco use disorder (quit approximately 2014), hypertension, hyperlipidemia, diabetes mellitus type II, pernicious anemia, dental abscess s/p tooth extraction, who presented to [facility] ED (4/22/2024) due to right sided neck swelling. Patient states that symptoms began approximately 1 month ago at which time patient developed right  sided facial swelling. Patient suspected she had a dental abscess as patient has had a dental abscess in the past requiring tooth extraction (roughly 10-15 years ago). She presented to her PCP for evaluation who patient states told her "she had an abscess" at which time she was prescribed an antibiotic which patient does not recall. Patient adhered to antibiotic regimen with the swelling was reduced at first but swelling continued to worsen such that swelling began to extend to the right neck from the right face prompting patient to present to [facility] ED for evaluation. She denies noting any overlying redness or rashes nor pain to the affected area. Denies fever, chills, sweats. Denies chest pain, cough, hemoptysis, shortness of breath. Denies abdominal pain, decreased appetite, involuntary weight loss, nausea, vomiting, hematemesis, constipation, diarrhea, hematochezia, melena. Denies increased or decreased urinary frequency, dysuria, hematuria. Denies lower extremity swelling, pain, rashes.  Patient has not have a family history of cancer.  She denies any use of oral tobacco, IV drugs, HIV, or any recent sick contacts.     ED course: Vitals show patient hypertensive, tachycardic, and tachypnic but saturations stable on room air. CBC unremarkable. CRP 17.40. ESR 28. CMP showed borderline severe hypercalcemia with calcium 13.7 and hyponatremia with mild sodium 132. CT soft tissue neck (04/23/2024) showed bilateral neck lymphadenopathy more extensive on right compared to left with " necrotic changes on right neck. CT chest abdomen pelvis with and without contrast (2024) showed continuation of right necrotic lymph node involvement inferiorly to the right axilla but no mediastinal or hilar lymphadenopathy. Internal medicine consulted for evaluation and management of right sided head and neck swelling concerning for underlying malignancy.        24hr interval   Patient resting in bed comfortably.  Hypercalcemia improving.  Discontinued calcitonin.  Corrected calcium at the moment 11.4.  Patient continued to be asymptomatic.  IR plan on biopsy tomorrow.  Patient states she would like to stay inpatient until biopsies completed.  Pain well controlled with Norco.  Objective:   Last 24 Hour Vital Signs:  BP  Min: 116/84  Max: 135/63  Temp  Av °F (37.2 °C)  Min: 98.4 °F (36.9 °C)  Max: 99.4 °F (37.4 °C)  Pulse  Av.7  Min: 95  Max: 104  Resp  Av.8  Min: 17  Max: 18  SpO2  Av.5 %  Min: 93 %  Max: 97 %  I/O last 3 completed shifts:  In: 3103.5 [P.O.:120; I.V.:2983.5]  Out: 1350 [Urine:1350]    Physical Examination:  Physical Examination:  Vitals:   Vitals:    24 1117   BP: 135/63   Pulse: 104   Resp:    Temp: 99 °F (37.2 °C)     Physical Exam  Vitals reviewed.   Constitutional:       General: She is in acute distress.      Appearance: Normal appearance. She is normal weight. She is not ill-appearing, toxic-appearing or diaphoretic.   HENT:      Head: Normocephalic and atraumatic.      Comments: Right face and neck edematous and indurated spanning from right pre and post auricular area down to right clavicle  Excoriations noted to base of right neck      Right Ear: External ear normal.      Left Ear: External ear normal.   Eyes:      General: No scleral icterus.        Right eye: No discharge.         Left eye: No discharge.      Extraocular Movements: Extraocular movements intact.      Conjunctiva/sclera: Conjunctivae normal.      Pupils: Pupils are equal, round, and  "reactive to light.   Cardiovascular:      Rate and Rhythm: Normal rate and regular rhythm.      Pulses: Normal pulses.      Heart sounds: Normal heart sounds. No murmur heard.     No friction rub. No gallop.   Pulmonary:      Effort: Pulmonary effort is normal. No respiratory distress.      Breath sounds: Normal breath sounds. No wheezing, rhonchi or rales.   Abdominal:      General: Abdomen is flat. Bowel sounds are normal. There is no distension.      Palpations: Abdomen is soft.      Tenderness: There is no abdominal tenderness. There is no guarding.   Musculoskeletal:         General: No swelling, tenderness, deformity or signs of injury. Normal range of motion.      Right lower leg: No edema.      Left lower leg: No edema.   Skin:     General: Skin is warm and dry.      Capillary Refill: Capillary refill takes less than 2 seconds.      Coloration: Skin is not jaundiced.      Findings: No bruising, erythema or rash.   Neurological:      Mental Status: She is alert.      Comments: AAO to person, place, time, and situation; CN II-XII grossly intact     Laboratory:    Recent Labs   Lab 04/22/24  1832 04/23/24  0430 04/24/24  0438   WBC 9.27 7.97 8.59   HGB 13.4 12.4 11.8*   HCT 39.1 36.2* 34.9*   * 426* 382   MCV 87.7 86.8 87.5   RDW 12.6 12.7 12.7   * 137 134*   K 4.4 4.2 4.3   CO2 25 27 21*   BUN 14.1 7.5* 4.7*   CREATININE 0.71 0.57 0.58   ALBUMIN 3.6 2.9*  3.2* 3.1*   BILITOT 0.5 0.5 0.6   AST 15 15 12   ALKPHOS 57 52 49   ALT 13 12 10       Coags: No results found for: "INR", "PROTIME", "PTT"  FLP: No results found for: "CHOL", "HDL", "LDLCALC", "TRIG", "CHOLHDL"  DM:   Lab Results   Component Value Date    CREATININE 0.58 04/24/2024     Thyroid:   Lab Results   Component Value Date    TSH 0.703 04/22/2024     Anemia: No results found for: "IRON", "TIBC", "FERRITIN", "WUNAJODY81", "FOLATE"  Cardiac: No results found for: "TROPONINI", "CKTOTAL", "CKMB", "BNP"  Pulm:   No results found for: " ""PO2ART", "FIO2", "PEEP"   Urinalysis:   Lab Results   Component Value Date    LABURIN No Growth 06/27/2023    PHUA 7.0 04/23/2024    UROBILINOGEN Normal 04/23/2024    WBCUA 0-5 04/23/2024       Trended Cardiac Data:  No results for input(s): "TROPONINI", "CKTOTAL", "CKMB", "BNP" in the last 168 hours.      Other Results:      Radiology:  Imaging Results              CT Soft Tissue Neck With Contrast (Final result)  Result time 04/22/24 20:47:44      Final result by Hilton Ricks MD (04/22/24 20:47:44)                   Impression:      Bilateral neck lymphadenopathy which is extensive on the right with central necrotic changes.  Findings may represent lymphoma leukemia leukemia or metastatic pathology.      Electronically signed by: Hilton Ricks  Date:    04/22/2024  Time:    20:47               Narrative:    EXAMINATION:  CT SOFT TISSUE NECK WITH CONTRAST    CLINICAL HISTORY:  large R sided neck mass extending into chest;    TECHNIQUE:  Multidetector axial images were performed of the neck following intravenous contrast administration and the images were reformatted.    Dose length product was 978 mGycm. Automated radiation control was utilized to minimize radiation dose.    COMPARISON:  None available    FINDINGS:  There are right neck intraparotid, periparotid, submandibular, jugulodigastric, deep cervical and lower neck numerous variable size masses consistent with lymphadenopathy.  Reference right jugulodigastric enlarged lymph node is 2.4 x 2.2 cm on image 39 series 2.  Two adjacent large centrally necrotic lymph nodes within the lower neck measure 3.7 x 4.5 cm and 3.5 x 3.7 cm on image 54 series 2.  There are also mildly enlarged left neck lymph nodes.    There are no parapharyngeal including fossa of Rosenmuller inflammatory changes. The epiglottis is not thickened and there are no findings of the larynx and the trachea.There is no asymmetric fullness or inflammatory changes of the aryepiglottic folds.   "                                     CT Chest Abdomen Pelvis With IV Contrast (XPD) Routine Oral Contrast (Final result)  Result time 04/22/24 20:55:14      Final result by Hilton Ricks MD (04/22/24 20:55:14)                   Impression:      1.  Right lower neck large necrotic lymph node proceeds inferiorly to the axillary region.  Additional right supraclavicular and infraclavicular lymph nodes.  No mediastinal or hilar lymphadenopathy.    2.  No chest acute infiltrates or effusions.    3.  No abdominopelvic visceral acute findings or lymphadenopathy.  Details of the findings above.      Electronically signed by: Hilton Ricks  Date:    04/22/2024  Time:    20:55               Narrative:    EXAMINATION:  CT CHEST ABDOMEN PELVIS WITH IV CONTRAST (XPD)    CLINICAL HISTORY:  large upper chest mass extending into R side of neck;    TECHNIQUE:  Multidetector axial images were obtained from the thoracic inlet through the greater trochanters following the administration of IV contrast.    Dose length product of 978 mGycm. Automated exposure control was utilized to minimize radiation dose.    COMPARISON:  None available.    CHEST FINDINGS:    The lungs are unremarkable without suspicious soft tissue pulmonary nodule, parenchyma consolidation, interstitial disease, pleural effusion or pneumothorax.  There are right lower lung lobe hypoventilatory changes.    There are right supraclavicular and infraclavicular enlarged lymph nodes.  Right lower neck large  necrotic lymph node proceeds inferiorly towards the axillary region.  There are no mediastinal or hilar enlarged lymph nodes.  Thoracic aorta is without aneurysmal dilatation or dissection.  Cardiac chamber size is within normal limits.    ABDOMINAL FINDINGS:    Liver and spleen are not enlarged in size.  Right hepatic lobe small hypodensity is not characterized and may represent a cyst on image 50 series 2.  Gallbladder lumen is without calcified calculus and there is  no thickening of the wall or pericholecystic fluid.  No apparent dilatation of ducts.  Pancreas is unremarkable.  Adrenals are normal size configuration.  Unremarkable enhancement of kidneys and the collecting systems are without dilatation.  There are no mesenteric or retroperitoneal periaortic enlarged lymph nodes.    Stomach is partially decompressed.  No abnormal dilatation of loops of small bowel and there is no focal or generalized mural thickening.  Colonic fecal loading without abnormal dilatation and there are no pericolonic acute strandings.  There is sigmoid colon noninflamed diverticulosis coli.  No bowel obstruction.  No free fluid.    PELVIC FINDINGS:    Urinary bladder wall is not thickened.  There is lobular contour of the uterus which may be related to fibroid.  Endometrium is not delineated.  No pelvic free fluid.  There are subcentimeter bilateral inguinal lymph nodes.    No acute or aggressive skeletal abnormality                                      Current Medications:     Infusions:  Current Facility-Administered Medications   Medication Dose Route Frequency Last Rate Last Admin    sodium chloride 0.9%   Intravenous Continuous 150 mL/hr at 04/24/24 0635 Rate Verify at 04/24/24 0635        Scheduled:  Current Facility-Administered Medications   Medication Dose Route Frequency    amLODIPine  5 mg Oral Daily    atorvastatin  20 mg Oral QHS    enoxparin  40 mg Subcutaneous Daily    lisinopriL  10 mg Oral Daily        PRN:    Current Facility-Administered Medications:     acetaminophen, 650 mg, Oral, Q4H PRN    cetirizine, 10 mg, Oral, Daily PRN    dextrose 10%, 12.5 g, Intravenous, PRN    dextrose 10%, 25 g, Intravenous, PRN    glucagon (human recombinant), 1 mg, Intramuscular, PRN    glucose, 16 g, Oral, PRN    glucose, 24 g, Oral, PRN    hydrALAZINE, 10 mg, Intravenous, Q2H PRN    insulin aspart U-100, 0-5 Units, Subcutaneous, QID (AC + HS) PRN    labetalol, 10 mg, Intravenous, Q4H PRN     melatonin, 6 mg, Oral, Nightly PRN    naloxone, 0.02 mg, Intravenous, PRN    ondansetron, 8 mg, Oral, Q8H PRN    oxyCODONE, 5 mg, Oral, Q8H PRN    polyethylene glycol, 17 g, Oral, Daily PRN    prochlorperazine, 5 mg, Intravenous, Q6H PRN    senna-docusate 8.6-50 mg, 1 tablet, Oral, BID PRN    simethicone, 1 tablet, Oral, TID PRN    sodium chloride 0.9%, 10 mL, Intravenous, PRN      Assessment:     Zoë Bone is a 69 y.o.female with  Patient Active Problem List    Diagnosis Date Noted    Neck swelling 04/22/2024        Plan:     Tobacco use disorder (quit 30 years ago), 20 pack-year history  Right facial and neck edema  Concern for squamous cell carcinoma vs lymphoma  -afebrile; wbc wnl without left shift  -CRP 17.40 and ESR 28  -CT soft tissue neck (04/23/2024) showed bilateral neck lymphadenopathy more extensive on right compared to left with necrotic changes on right neck  -CT chest abdomen pelvis with and without contrast (04/22/2024) showed continuation of right necrotic lymph node involvement inferiorly to the right axilla but no mediastinal or hilar lymphadenopathy  -patient reports she was seen by PCP as outpatient and was told she had an abscess which was treated with an abx that she took twice daily (patient nor family remember name) however symptoms did not improve with abx therapy  -presentation concerning for malignancy likely squamous cell given significant smoking history (approximately 2 ppd x 40 years).  But given the aggressive nature lymphoma is also high on differential.  - consulted IR for biopsy.  will attempt biopsy 4/25 after evaluation.  -ENT consulted  - QuantiFERON gold pending.  Suggested by ENT after noting feel that she was lesion overlying edema.  Patient did endorse that she has been scratching at the lesion.  Lesion consistent with excoriations.  -will plan to refer to hematology and oncology at discharge  -HIV negative  -patient tachycardic and tachypnic on admission however  patient was also deeply emotional after being told results of imaging and concern for possible malignancy  -saturations stable between 94 % and 97%  -tachypnea subsequently resolved with improvement in mood.  Tachycardia also resolved.  -EKG on admission showed sinus tachycardia with low voltage QRS in V1-V6        Hypercalcemia (improving)  Corrected calcium 14.02   -hypercalcemia likely 2/2 underlying malignancy  -given 2L LR while in ED  -denies any bone pain, any abdominal pains component, any urinary complaints, or change in mentation.  -discontinue IV calcitonin 4 units/kg bid x 2 days  -NS continuous flow 150 mL an hour.  -zoledronic acid 4 mg IV given.   Bisphosphonate we will likely take 2 days before response to treatment is seen.  -PTHrP pending  -PTH 23.2 (WNL)  -slightly hyponatremic.  Likely dilutional from fluids.     Hypertension (controlled)  -restarted home lisinopril 10 mg qd at home  -initiated amlodipine 5 mg qd for additionally bp control  -continue prn hydralazine and labetalol per antihypertensive parameters  -will continue to titrate antihypertensives as indicated     Hyperlipidemia (being treated)  -on pravastatin 20 mg qd as outpatient  -pravastatin not on formulary  -initiated atorvastatin 20 mg qd     Diabetes mellitus type II (controlled)  -glucose 203 on admission  -goal blood glucose < 180  -on novolin 70/30 at home  -blood glucose averaging 160.  Start 5 units aspart with meals. Reduced dose from home given change in diet inpatient.  -initiated LDSSI  -will continue to monitor and titrate insulin regimen as indicated     Code:  Full code  Diet:  Diabetic  DVT/GI PPX:  Lovenox  Dispo:  None    Kody Goldberg M.D  [facility] PGY-1  04/24/2024

## 2024-04-25 VITALS
HEART RATE: 93 BPM | WEIGHT: 166.38 LBS | SYSTOLIC BLOOD PRESSURE: 128 MMHG | TEMPERATURE: 99 F | OXYGEN SATURATION: 95 % | RESPIRATION RATE: 18 BRPM | HEIGHT: 62 IN | BODY MASS INDEX: 30.62 KG/M2 | DIASTOLIC BLOOD PRESSURE: 73 MMHG

## 2024-04-25 LAB
1,25(OH)2D SERPL-MCNC: 93 PG/ML (ref 18–78)
ALBUMIN SERPL-MCNC: 3.1 G/DL (ref 3.4–4.8)
ALBUMIN/GLOB SERPL: 0.8 RATIO (ref 1.1–2)
ALP SERPL-CCNC: 45 UNIT/L (ref 40–150)
ALT SERPL-CCNC: 10 UNIT/L (ref 0–55)
ANION GAP SERPL CALC-SCNC: 8 MEQ/L
AST SERPL-CCNC: 15 UNIT/L (ref 5–34)
BASOPHILS # BLD AUTO: 0.03 X10(3)/MCL
BASOPHILS NFR BLD AUTO: 0.4 %
BILIRUB SERPL-MCNC: 0.5 MG/DL
BUN SERPL-MCNC: 5.2 MG/DL (ref 9.8–20.1)
BUN SERPL-MCNC: 6.6 MG/DL (ref 9.8–20.1)
CALCIUM SERPL-MCNC: 10.3 MG/DL (ref 8.4–10.2)
CALCIUM SERPL-MCNC: 10.6 MG/DL (ref 8.4–10.2)
CHLORIDE SERPL-SCNC: 106 MMOL/L (ref 98–107)
CHLORIDE SERPL-SCNC: 108 MMOL/L (ref 98–107)
CO2 SERPL-SCNC: 20 MMOL/L (ref 23–31)
CO2 SERPL-SCNC: 21 MMOL/L (ref 23–31)
CREAT SERPL-MCNC: 0.54 MG/DL (ref 0.55–1.02)
CREAT SERPL-MCNC: 0.55 MG/DL (ref 0.55–1.02)
CREAT/UREA NIT SERPL: 12
EOSINOPHIL # BLD AUTO: 0.05 X10(3)/MCL (ref 0–0.9)
EOSINOPHIL NFR BLD AUTO: 0.6 %
ERYTHROCYTE [DISTWIDTH] IN BLOOD BY AUTOMATED COUNT: 12.7 % (ref 11.5–17)
GFR SERPLBLD CREATININE-BSD FMLA CKD-EPI: >60 MLS/MIN/1.73/M2
GFR SERPLBLD CREATININE-BSD FMLA CKD-EPI: >60 MLS/MIN/1.73/M2
GLOBULIN SER-MCNC: 3.9 GM/DL (ref 2.4–3.5)
GLUCOSE SERPL-MCNC: 134 MG/DL (ref 82–115)
GLUCOSE SERPL-MCNC: 135 MG/DL (ref 82–115)
HCT VFR BLD AUTO: 35.1 % (ref 37–47)
HGB BLD-MCNC: 11.9 G/DL (ref 12–16)
IMM GRANULOCYTES # BLD AUTO: 0.02 X10(3)/MCL (ref 0–0.04)
IMM GRANULOCYTES NFR BLD AUTO: 0.3 %
LYMPHOCYTES # BLD AUTO: 1.91 X10(3)/MCL (ref 0.6–4.6)
LYMPHOCYTES NFR BLD AUTO: 24 %
MAGNESIUM SERPL-MCNC: 1.4 MG/DL (ref 1.6–2.6)
MAGNESIUM SERPL-MCNC: 1.8 MG/DL (ref 1.6–2.6)
MCH RBC QN AUTO: 29.8 PG (ref 27–31)
MCHC RBC AUTO-ENTMCNC: 33.9 G/DL (ref 33–36)
MCV RBC AUTO: 88 FL (ref 80–94)
MONOCYTES # BLD AUTO: 0.64 X10(3)/MCL (ref 0.1–1.3)
MONOCYTES NFR BLD AUTO: 8.1 %
NEUTROPHILS # BLD AUTO: 5.3 X10(3)/MCL (ref 2.1–9.2)
NEUTROPHILS NFR BLD AUTO: 66.6 %
NRBC BLD AUTO-RTO: 0 %
PHOSPHATE SERPL-MCNC: 1.3 MG/DL (ref 2.3–4.7)
PHOSPHATE SERPL-MCNC: 2.4 MG/DL (ref 2.3–4.7)
PLATELET # BLD AUTO: 372 X10(3)/MCL (ref 130–400)
PMV BLD AUTO: 8.8 FL (ref 7.4–10.4)
POCT GLUCOSE: 128 MG/DL (ref 70–110)
POCT GLUCOSE: 149 MG/DL (ref 70–110)
POCT GLUCOSE: 179 MG/DL (ref 70–110)
POTASSIUM SERPL-SCNC: 3.9 MMOL/L (ref 3.5–5.1)
POTASSIUM SERPL-SCNC: 4 MMOL/L (ref 3.5–5.1)
PROT SERPL-MCNC: 7 GM/DL (ref 5.8–7.6)
RBC # BLD AUTO: 3.99 X10(6)/MCL (ref 4.2–5.4)
SODIUM SERPL-SCNC: 135 MMOL/L (ref 136–145)
SODIUM SERPL-SCNC: 135 MMOL/L (ref 136–145)
WBC # SPEC AUTO: 7.95 X10(3)/MCL (ref 4.5–11.5)

## 2024-04-25 PROCEDURE — 36415 COLL VENOUS BLD VENIPUNCTURE: CPT

## 2024-04-25 PROCEDURE — 83735 ASSAY OF MAGNESIUM: CPT

## 2024-04-25 PROCEDURE — 63600175 PHARM REV CODE 636 W HCPCS

## 2024-04-25 PROCEDURE — 84100 ASSAY OF PHOSPHORUS: CPT

## 2024-04-25 PROCEDURE — 88305 TISSUE EXAM BY PATHOLOGIST: CPT | Mod: TC | Performed by: INTERNAL MEDICINE

## 2024-04-25 PROCEDURE — 80053 COMPREHEN METABOLIC PANEL: CPT

## 2024-04-25 PROCEDURE — 85025 COMPLETE CBC W/AUTO DIFF WBC: CPT

## 2024-04-25 PROCEDURE — 25000003 PHARM REV CODE 250

## 2024-04-25 RX ORDER — LISINOPRIL 10 MG/1
10 TABLET ORAL DAILY
Qty: 90 TABLET | Refills: 3 | Status: SHIPPED | OUTPATIENT
Start: 2024-04-26 | End: 2025-04-26

## 2024-04-25 RX ORDER — AMOXICILLIN 250 MG
1 CAPSULE ORAL 2 TIMES DAILY PRN
Qty: 25 TABLET | Refills: 0 | Status: SHIPPED | OUTPATIENT
Start: 2024-04-25

## 2024-04-25 RX ORDER — OXYCODONE HYDROCHLORIDE 5 MG/1
5 TABLET ORAL EVERY 6 HOURS PRN
Status: DISCONTINUED | OUTPATIENT
Start: 2024-04-25 | End: 2024-04-25 | Stop reason: HOSPADM

## 2024-04-25 RX ORDER — HYDROXYZINE PAMOATE 25 MG/1
25 CAPSULE ORAL EVERY 6 HOURS PRN
Status: DISCONTINUED | OUTPATIENT
Start: 2024-04-25 | End: 2024-04-25 | Stop reason: HOSPADM

## 2024-04-25 RX ORDER — HYDROXYZINE PAMOATE 25 MG/1
25 CAPSULE ORAL EVERY 8 HOURS PRN
Qty: 20 CAPSULE | Refills: 0 | Status: SHIPPED | OUTPATIENT
Start: 2024-04-25

## 2024-04-25 RX ORDER — ATORVASTATIN CALCIUM 20 MG/1
20 TABLET, FILM COATED ORAL NIGHTLY
Qty: 90 TABLET | Refills: 3 | Status: SHIPPED | OUTPATIENT
Start: 2024-04-25 | End: 2025-04-25

## 2024-04-25 RX ORDER — CETIRIZINE HYDROCHLORIDE 10 MG/1
10 TABLET ORAL DAILY PRN
Qty: 30 TABLET | Refills: 0 | Status: SHIPPED | OUTPATIENT
Start: 2024-04-25 | End: 2025-04-25

## 2024-04-25 RX ORDER — ALPRAZOLAM 0.25 MG/1
0.25 TABLET ORAL DAILY PRN
Qty: 15 TABLET | Refills: 0 | Status: SHIPPED | OUTPATIENT
Start: 2024-04-25

## 2024-04-25 RX ORDER — AMLODIPINE BESYLATE 5 MG/1
5 TABLET ORAL DAILY
Qty: 90 TABLET | Refills: 3 | Status: SHIPPED | OUTPATIENT
Start: 2024-04-26 | End: 2025-04-26

## 2024-04-25 RX ORDER — OXYCODONE HYDROCHLORIDE 10 MG/1
10 TABLET ORAL EVERY 6 HOURS PRN
Qty: 20 TABLET | Refills: 0 | Status: SHIPPED | OUTPATIENT
Start: 2024-04-25

## 2024-04-25 RX ORDER — ACETAMINOPHEN 325 MG/1
650 TABLET ORAL EVERY 6 HOURS PRN
Qty: 30 TABLET | Refills: 0 | Status: SHIPPED | OUTPATIENT
Start: 2024-04-25

## 2024-04-25 RX ORDER — ALPRAZOLAM 0.25 MG/1
0.25 TABLET ORAL DAILY PRN
Qty: 15 TABLET | Refills: 0 | Status: SHIPPED | OUTPATIENT
Start: 2024-04-25 | End: 2024-04-25

## 2024-04-25 RX ORDER — OXYCODONE HYDROCHLORIDE 10 MG/1
10 TABLET ORAL EVERY 6 HOURS PRN
Qty: 20 TABLET | Refills: 0 | Status: SHIPPED | OUTPATIENT
Start: 2024-04-25 | End: 2024-04-25

## 2024-04-25 RX ORDER — MAGNESIUM SULFATE HEPTAHYDRATE 40 MG/ML
2 INJECTION, SOLUTION INTRAVENOUS ONCE
Status: COMPLETED | OUTPATIENT
Start: 2024-04-25 | End: 2024-04-25

## 2024-04-25 RX ADMIN — MAGNESIUM SULFATE HEPTAHYDRATE 2 G: 40 INJECTION, SOLUTION INTRAVENOUS at 07:04

## 2024-04-25 RX ADMIN — INSULIN ASPART 5 UNITS: 100 INJECTION, SOLUTION INTRAVENOUS; SUBCUTANEOUS at 12:04

## 2024-04-25 RX ADMIN — INSULIN ASPART 5 UNITS: 100 INJECTION, SOLUTION INTRAVENOUS; SUBCUTANEOUS at 04:04

## 2024-04-25 RX ADMIN — OXYCODONE HYDROCHLORIDE 5 MG: 5 TABLET ORAL at 11:04

## 2024-04-25 RX ADMIN — OXYCODONE HYDROCHLORIDE 5 MG: 5 TABLET ORAL at 02:04

## 2024-04-25 RX ADMIN — LISINOPRIL 10 MG: 10 TABLET ORAL at 08:04

## 2024-04-25 RX ADMIN — ACETAMINOPHEN 650 MG: 325 TABLET, FILM COATED ORAL at 04:04

## 2024-04-25 RX ADMIN — ACETAMINOPHEN 650 MG: 325 TABLET, FILM COATED ORAL at 07:04

## 2024-04-25 RX ADMIN — AMLODIPINE BESYLATE 5 MG: 5 TABLET ORAL at 08:04

## 2024-04-25 RX ADMIN — SODIUM PHOSPHATE, MONOBASIC, MONOHYDRATE AND SODIUM PHOSPHATE, DIBASIC, ANHYDROUS 30 MMOL: 142; 276 INJECTION, SOLUTION INTRAVENOUS at 10:04

## 2024-04-25 RX ADMIN — ENOXAPARIN SODIUM 40 MG: 40 INJECTION SUBCUTANEOUS at 04:04

## 2024-04-25 NOTE — PROGRESS NOTES
"[facility] Internal Medicine Progress Note    Subjective:   Zoë Bone is a 69 y.o.  female with PMH of tobacco use disorder (quit approximately 2014), hypertension, hyperlipidemia, diabetes mellitus type II, pernicious anemia, dental abscess s/p tooth extraction, who presented to [facility] ED (4/22/2024) due to right sided neck swelling. Patient states that symptoms began approximately 1 month ago at which time patient developed right  sided facial swelling. Patient suspected she had a dental abscess as patient has had a dental abscess in the past requiring tooth extraction (roughly 10-15 years ago). She presented to her PCP for evaluation who patient states told her "she had an abscess" at which time she was prescribed an antibiotic which patient does not recall. Patient adhered to antibiotic regimen with the swelling was reduced at first but swelling continued to worsen such that swelling began to extend to the right neck from the right face prompting patient to present to [facility] ED for evaluation. She denies noting any overlying redness or rashes nor pain to the affected area. Denies fever, chills, sweats. Denies chest pain, cough, hemoptysis, shortness of breath. Denies abdominal pain, decreased appetite, involuntary weight loss, nausea, vomiting, hematemesis, constipation, diarrhea, hematochezia, melena. Denies increased or decreased urinary frequency, dysuria, hematuria. Denies lower extremity swelling, pain, rashes.  Patient has not have a family history of cancer.  She denies any use of oral tobacco, IV drugs, HIV, or any recent sick contacts.     ED course: Vitals show patient hypertensive, tachycardic, and tachypnic but saturations stable on room air. CBC unremarkable. CRP 17.40. ESR 28. CMP showed borderline severe hypercalcemia with calcium 13.7 and hyponatremia with mild sodium 132. CT soft tissue neck (04/23/2024) showed bilateral neck lymphadenopathy more extensive on right compared to left with " necrotic changes on right neck. CT chest abdomen pelvis with and without contrast (2024) showed continuation of right necrotic lymph node involvement inferiorly to the right axilla but no mediastinal or hilar lymphadenopathy. Internal medicine consulted for evaluation and management of right sided head and neck swelling concerning for underlying malignancy.        24hr interval  Seen this morning, no acute events overnight.  Patient resting comfortably in bed although anxious appearing.  Has been NPO at midnight for IR guided biopsy today.   Increased dose of pain medications to oxycodone q.6 hours as needed.  Initiated on hydroxyzine 25 mg q.6 hours.  Repleting electrolytes and BMP, Mag, phos to be collected later today.  Possible discharge if calcium level has improved.    Objective:   Last 24 Hour Vital Signs:  BP  Min: 111/66  Max: 142/78  Temp  Av.8 °F (37.1 °C)  Min: 98 °F (36.7 °C)  Max: 99.4 °F (37.4 °C)  Pulse  Av  Min: 91  Max: 104  Resp  Av.8  Min: 17  Max: 18  SpO2  Av.7 %  Min: 91 %  Max: 97 %  I/O last 3 completed shifts:  In: 1983.5 [I.V.:1983.5]  Out: 700 [Urine:700]    Physical Examination:  Physical Examination:  Vitals:   Vitals:    24 0351   BP: 111/66   Pulse: 91   Resp: 18   Temp: 98.9 °F (37.2 °C)     Physical Exam  Vitals and nursing note reviewed.   Constitutional:       General: She is not in acute distress.     Appearance: Normal appearance.   HENT:      Head: Normocephalic and atraumatic.      Comments: Right-sided facial swelling     Mouth/Throat:      Mouth: Mucous membranes are moist.      Pharynx: Oropharynx is clear.   Eyes:      Conjunctiva/sclera: Conjunctivae normal.      Pupils: Pupils are equal, round, and reactive to light.   Neck:      Thyroid: No thyromegaly.      Comments: Right sided neck with swollen lymph nodes from supraclavicular region to posterior aspect of neck  Cardiovascular:      Rate and Rhythm: Normal rate and regular rhythm.  "  Musculoskeletal:      Cervical back: Normal range of motion. No rigidity or tenderness.   Lymphadenopathy:      Cervical: Cervical adenopathy present.   Skin:     Comments: Areas of streaky erythema over lying lymphadenopathy of right-sided neck   Neurological:      General: No focal deficit present.      Mental Status: She is alert and oriented to person, place, and time. Mental status is at baseline.   Psychiatric:         Mood and Affect: Mood is anxious.       Laboratory:    Recent Labs   Lab 04/23/24  0430 04/24/24  0438 04/25/24  0447   WBC 7.97 8.59 7.95   HGB 12.4 11.8* 11.9*   HCT 36.2* 34.9* 35.1*   * 382 372   MCV 86.8 87.5 88.0   RDW 12.7 12.7 12.7    134* 135*   K 4.2 4.3 4.0   CO2 27 21* 20*   BUN 7.5* 4.7* 5.2*   CREATININE 0.57 0.58 0.55   ALBUMIN 2.9*  3.2* 3.1* 3.1*   BILITOT 0.5 0.6 0.5   AST 15 12 15   ALKPHOS 52 49 45   ALT 12 10 10       Coags: No results found for: "INR", "PROTIME", "PTT"  FLP: No results found for: "CHOL", "HDL", "LDLCALC", "TRIG", "CHOLHDL"  DM:   Lab Results   Component Value Date    CREATININE 0.55 04/25/2024     Thyroid:   Lab Results   Component Value Date    TSH 0.703 04/22/2024     Anemia: No results found for: "IRON", "TIBC", "FERRITIN", "SFZHGYFX38", "FOLATE"  Cardiac: No results found for: "TROPONINI", "CKTOTAL", "CKMB", "BNP"  Pulm:   No results found for: "PO2ART", "FIO2", "PEEP"   Urinalysis:   Lab Results   Component Value Date    LABURIN No Growth 06/27/2023    PHUA 7.0 04/23/2024    UROBILINOGEN Normal 04/23/2024    WBCUA 0-5 04/23/2024       Trended Cardiac Data:  No results for input(s): "TROPONINI", "CKTOTAL", "CKMB", "BNP" in the last 168 hours.      Other Results:      Radiology:  Imaging Results              CT Soft Tissue Neck With Contrast (Final result)  Result time 04/22/24 20:47:44      Final result by Hilton Ricks MD (04/22/24 20:47:44)                   Impression:      Bilateral neck lymphadenopathy which is extensive on the " right with central necrotic changes.  Findings may represent lymphoma leukemia leukemia or metastatic pathology.      Electronically signed by: Hilton Ricks  Date:    04/22/2024  Time:    20:47               Narrative:    EXAMINATION:  CT SOFT TISSUE NECK WITH CONTRAST    CLINICAL HISTORY:  large R sided neck mass extending into chest;    TECHNIQUE:  Multidetector axial images were performed of the neck following intravenous contrast administration and the images were reformatted.    Dose length product was 978 mGycm. Automated radiation control was utilized to minimize radiation dose.    COMPARISON:  None available    FINDINGS:  There are right neck intraparotid, periparotid, submandibular, jugulodigastric, deep cervical and lower neck numerous variable size masses consistent with lymphadenopathy.  Reference right jugulodigastric enlarged lymph node is 2.4 x 2.2 cm on image 39 series 2.  Two adjacent large centrally necrotic lymph nodes within the lower neck measure 3.7 x 4.5 cm and 3.5 x 3.7 cm on image 54 series 2.  There are also mildly enlarged left neck lymph nodes.    There are no parapharyngeal including fossa of Rosenmuller inflammatory changes. The epiglottis is not thickened and there are no findings of the larynx and the trachea.There is no asymmetric fullness or inflammatory changes of the aryepiglottic folds.                                       CT Chest Abdomen Pelvis With IV Contrast (XPD) Routine Oral Contrast (Final result)  Result time 04/22/24 20:55:14      Final result by Hilton Ricks MD (04/22/24 20:55:14)                   Impression:      1.  Right lower neck large necrotic lymph node proceeds inferiorly to the axillary region.  Additional right supraclavicular and infraclavicular lymph nodes.  No mediastinal or hilar lymphadenopathy.    2.  No chest acute infiltrates or effusions.    3.  No abdominopelvic visceral acute findings or lymphadenopathy.  Details of the findings  above.      Electronically signed by: Hilton Culpahim  Date:    04/22/2024  Time:    20:55               Narrative:    EXAMINATION:  CT CHEST ABDOMEN PELVIS WITH IV CONTRAST (XPD)    CLINICAL HISTORY:  large upper chest mass extending into R side of neck;    TECHNIQUE:  Multidetector axial images were obtained from the thoracic inlet through the greater trochanters following the administration of IV contrast.    Dose length product of 978 mGycm. Automated exposure control was utilized to minimize radiation dose.    COMPARISON:  None available.    CHEST FINDINGS:    The lungs are unremarkable without suspicious soft tissue pulmonary nodule, parenchyma consolidation, interstitial disease, pleural effusion or pneumothorax.  There are right lower lung lobe hypoventilatory changes.    There are right supraclavicular and infraclavicular enlarged lymph nodes.  Right lower neck large  necrotic lymph node proceeds inferiorly towards the axillary region.  There are no mediastinal or hilar enlarged lymph nodes.  Thoracic aorta is without aneurysmal dilatation or dissection.  Cardiac chamber size is within normal limits.    ABDOMINAL FINDINGS:    Liver and spleen are not enlarged in size.  Right hepatic lobe small hypodensity is not characterized and may represent a cyst on image 50 series 2.  Gallbladder lumen is without calcified calculus and there is no thickening of the wall or pericholecystic fluid.  No apparent dilatation of ducts.  Pancreas is unremarkable.  Adrenals are normal size configuration.  Unremarkable enhancement of kidneys and the collecting systems are without dilatation.  There are no mesenteric or retroperitoneal periaortic enlarged lymph nodes.    Stomach is partially decompressed.  No abnormal dilatation of loops of small bowel and there is no focal or generalized mural thickening.  Colonic fecal loading without abnormal dilatation and there are no pericolonic acute strandings.  There is sigmoid colon  noninflamed diverticulosis coli.  No bowel obstruction.  No free fluid.    PELVIC FINDINGS:    Urinary bladder wall is not thickened.  There is lobular contour of the uterus which may be related to fibroid.  Endometrium is not delineated.  No pelvic free fluid.  There are subcentimeter bilateral inguinal lymph nodes.    No acute or aggressive skeletal abnormality                                      Current Medications:     Infusions:  Current Facility-Administered Medications   Medication Dose Route Frequency Last Rate Last Admin    sodium chloride 0.9%   Intravenous Continuous 150 mL/hr at 04/25/24 0643 Rate Verify at 04/25/24 0643        Scheduled:  Current Facility-Administered Medications   Medication Dose Route Frequency    amLODIPine  5 mg Oral Daily    atorvastatin  20 mg Oral QHS    enoxparin  40 mg Subcutaneous Daily    insulin aspart U-100  5 Units Subcutaneous TIDWM    lisinopriL  10 mg Oral Daily        PRN:    Current Facility-Administered Medications:     acetaminophen, 650 mg, Oral, Q4H PRN    cetirizine, 10 mg, Oral, Daily PRN    dextrose 10%, 12.5 g, Intravenous, PRN    dextrose 10%, 25 g, Intravenous, PRN    glucagon (human recombinant), 1 mg, Intramuscular, PRN    glucose, 16 g, Oral, PRN    glucose, 24 g, Oral, PRN    hydrALAZINE, 10 mg, Intravenous, Q2H PRN    insulin aspart U-100, 0-5 Units, Subcutaneous, QID (AC + HS) PRN    labetalol, 10 mg, Intravenous, Q4H PRN    melatonin, 6 mg, Oral, Nightly PRN    naloxone, 0.02 mg, Intravenous, PRN    ondansetron, 8 mg, Oral, Q8H PRN    oxyCODONE, 5 mg, Oral, Q8H PRN    polyethylene glycol, 17 g, Oral, Daily PRN    prochlorperazine, 5 mg, Intravenous, Q6H PRN    senna-docusate 8.6-50 mg, 1 tablet, Oral, BID PRN    simethicone, 1 tablet, Oral, TID PRN    sodium chloride 0.9%, 10 mL, Intravenous, PRN      Assessment:     Zoë Bone is a 69 y.o.female with  Patient Active Problem List    Diagnosis Date Noted    Neck swelling 04/22/2024        Plan:      Tobacco use disorder (quit 30 years ago), 20 pack-year history  Right facial and neck edema  Concern for squamous cell carcinoma vs lymphoma  -afebrile; wbc wnl without left shift  -CRP 17.40 and ESR 28  -CT soft tissue neck (04/23/2024) showed bilateral neck lymphadenopathy more extensive on right compared to left with necrotic changes on right neck  -CT chest abdomen pelvis with and without contrast (04/22/2024) showed continuation of right necrotic lymph node involvement inferiorly to the right axilla but no mediastinal or hilar lymphadenopathy  -patient reports she was seen by PCP as outpatient and was told she had an abscess which was treated with an abx that she took twice daily (patient nor family remember name) however symptoms did not improve with abx therapy  -presentation concerning for malignancy likely squamous cell given significant smoking history (approximately 2 ppd x 40 years).  But given the aggressive nature lymphoma is also high on differential.  -ENT consulted-we will follow up outpatient  - QuantiFERON gold pending.  Suggested by ENT after noting feel that she was lesion overlying edema.  Patient did endorse that she has been scratching at the lesion.  Lesion consistent with excoriations.  -will plan to refer to hematology and oncology at discharge  -HIV negative  -EKG on admission showed sinus tachycardia with low voltage QRS in V1-V6     Hypercalcemia (improving)  Corrected calcium 14.02 on admission-11.3 today  -hypercalcemia likely 2/2 underlying malignancy  -denies any bone pain, any abdominal pains component, any urinary complaints, or change in mentation.  -has received IV calcitonin 4 units/kg bid x 2 days  -zoledronic acid 4 mg IV given.   Bisphosphonate we will likely take 2 days before response to treatment is seen.  -PTHrP pending  -PTH 23.2 (WNL)  -continue NS at 100 cc/hour  -repeating electrolyte panel today    Electrolyte abnormalities - hypomagnesemia, hypophosphatemia,  hyponatremia  - repleting electrolytes today; repeat BMP, Mag, phos at 1630 today     Hypertension (controlled)  -restarted home lisinopril 10 mg qd at home  -initiated amlodipine 5 mg qd for additionally bp control  -continue prn hydralazine and labetalol per antihypertensive parameters  -will continue to titrate antihypertensives as indicated     Hyperlipidemia (being treated)  -on pravastatin 20 mg qd as outpatient  -pravastatin not on formulary  -initiated atorvastatin 20 mg qd     Diabetes mellitus type II (controlled)  -glucose 203 on admission  -goal blood glucose < 180  -on novolin 70/30 at home  -blood glucose averaging 160.  Start 5 units aspart with meals. Reduced dose from home given change in diet inpatient.  -initiated LDSSI  -will continue to monitor and titrate insulin regimen as indicated       Code:  Full code  Diet:  Diabetic  DVT/GI PPX:  Lovenox  Dispo:  Pending evening labs.  Possible discharge today if hypercalcemia has improved.      Chintan Gayle DO  [facility] Internal Medicine PGY-II

## 2024-04-26 LAB
GAMMA INTERFERON BACKGROUND BLD IA-ACNC: 0.6 IU/ML
M TB IFN-G BLD-IMP: NEGATIVE
M TB IFN-G CD4+ BCKGRND COR BLD-ACNC: 0.05 IU/ML
M TB IFN-G CD4+CD8+ BCKGRND COR BLD-ACNC: -0.04 IU/ML
MITOGEN IGNF BCKGRD COR BLD-ACNC: 9.4 IU/ML
PTH RELATED PROT SERPL-SCNC: 13 PMOL/L

## 2024-04-26 NOTE — DISCHARGE SUMMARY
"[facility] Internal Medicine Discharge Summary    Admitting Physician: Vero Adler MD  Attending Physician: No att. providers found  Date of Admit: 4/22/2024  Date of Discharge: 4/25/2024    Condition: Stable  Outcome: Patient tolerated treatment/procedure well without complication and is now ready for discharge.  DISPOSITION: Home or Self Care        Discharge Diagnoses:     Patient Active Problem List   Diagnosis    Neck swelling       Principal Problem:  Neck swelling    Consultants and Procedures:     Consultants:  IP CONSULT TO INTERNAL MEDICINE  IP CONSULT TO INTERVENTIONAL RADIOLOGY  IP CONSULT TO SOCIAL WORK/CASE MANAGEMENT    Procedures:   * No surgery found *      Brief Admission History:      Zoë Bone is a 69 y.o.  female with PMH of tobacco use disorder (quit approximately 2014), hypertension, hyperlipidemia, diabetes mellitus type II, pernicious anemia, dental abscess s/p tooth extraction, who presented to [facility] ED (4/22/2024) due to right sided neck swelling. Patient states that symptoms began approximately 1 month ago at which time patient developed right  sided facial swelling. Patient suspected she had a dental abscess as patient has had a dental abscess in the past requiring tooth extraction (roughly 10-15 years ago). She presented to her PCP for evaluation who patient states told her "she had an abscess" at which time she was prescribed an antibiotic which patient does not recall. Patient adhered to antibiotic regimen with the swelling was reduced at first but swelling continued to worsen such that swelling began to extend to the right neck from the right face prompting patient to present to [facility] ED for evaluation. She denies noting any overlying redness or rashes nor pain to the affected area. Denies fever, chills, sweats. Denies chest pain, cough, hemoptysis, shortness of breath. Denies abdominal pain, decreased appetite, involuntary weight loss, nausea, vomiting, hematemesis, " constipation, diarrhea, hematochezia, melena. Denies increased or decreased urinary frequency, dysuria, hematuria. Denies lower extremity swelling, pain, rashes.  Patient has not have a family history of cancer.  She denies any use of oral tobacco, IV drugs, HIV, or any recent sick contacts.     ED course: Vitals show patient hypertensive, tachycardic, and tachypnic but saturations stable on room air. CBC unremarkable. CRP 17.40. ESR 28. CMP showed borderline severe hypercalcemia with calcium 13.7 and hyponatremia with mild sodium 132. CT soft tissue neck (04/23/2024) showed bilateral neck lymphadenopathy more extensive on right compared to left with necrotic changes on right neck. CT chest abdomen pelvis with and without contrast (04/22/2024) showed continuation of right necrotic lymph node involvement inferiorly to the right axilla but no mediastinal or hilar lymphadenopathy. Internal medicine consulted for evaluation and management of right sided head and neck swelling concerning for underlying malignancy.     Hospital Course with Pertinent Findings:      Patient admitted for severe hypercalcemia.  She was started on IV normal saline, calcitonin 4 units/kg b.i.d. X 2 days, and IV zoledronic acid 4 mg I 1 V.  PTH 23.2 and PTH RP pending.  Her hypercalcemia improved with as mentioned therapy.  ENT consulted and recommended IR guided biopsy.  IR performed biopsy of neck adenopathy on 04/25/2024 without complications.  Sent for pathology and awaiting results.  She is to follow up with her PCP within 2 weeks.  She is also referred to ENT, and Hematology and Oncology with both referrals placed as urgent.  Patient had notable anxiety regarding her possible diagnosis and had significant generalized pain.  She was provided with opioids, short-term benzodiazepine course, and p.r.n. Vistaril.  Discussed importance of not taking opiates with benzodiazepine.  Discussed with daughter who is a RN regarding proper follow up with  "appropriate physicians.    At this time, Zoë Bone is determined to have maximized benefits of IP hospitalization. she is discharged in stable condition with OP f/u recommendations and instructions. All questions answered, and patient verbalized agreement with the POC. They were given return precautions prior to d/c including symptoms that should prompt return to ED or to call PCP. Total time spent of DC of 35 minutes.     Discharge physical exam:  Vitals  BP: 128/73  Temp: 98.6 °F (37 °C)  Temp Source: Oral  Pulse: 93  Resp: 18  SpO2: 95 %  Height: 5' 2" (157.5 cm)  Weight: 75.5 kg (166 lb 6.4 oz)    General: Well nourished w/o distress  Neck: Full ROM; right-sided lymphadenopathy present from super clavicular area to posterior neck.  Pulm: CTA bilaterally, normal work of breathing  CV: S1, S2 w/o murmurs or gallops; no edema noted  GI: Soft with normal bowel sounds in all quadrants, no masses on palpation  MSK: Full ROM of all extremities and spine w/o limitation or discomfort  Derm:  Right-sided adenopathy with red streaky appearance.  Neuro: AAOx4; CN II-XII intact; motor/sensory function intact      Discharge Medications:         Medication List        START taking these medications      acetaminophen 325 MG tablet  Commonly known as: TYLENOL  Take 2 tablets (650 mg total) by mouth every 6 (six) hours as needed for Pain.     ALPRAZolam 0.25 MG tablet  Commonly known as: XANAX  Take 1 tablet (0.25 mg total) by mouth daily as needed for Anxiety.     amLODIPine 5 MG tablet  Commonly known as: NORVASC  Take 1 tablet (5 mg total) by mouth once daily.  Start taking on: April 26, 2024     atorvastatin 20 MG tablet  Commonly known as: LIPITOR  Take 1 tablet (20 mg total) by mouth every evening.     cetirizine 10 MG tablet  Commonly known as: ZYRTEC  Take 1 tablet (10 mg total) by mouth daily as needed for Allergies.     hydrOXYzine pamoate 25 MG Cap  Commonly known as: VISTARIL  Take 1 capsule (25 mg total) by mouth " every 8 (eight) hours as needed (anxiety).     lisinopriL 10 MG tablet  Take 1 tablet (10 mg total) by mouth once daily.  Start taking on: April 26, 2024     oxyCODONE 10 mg Tab immediate release tablet  Commonly known as: ROXICODONE  Take 1 tablet (10 mg total) by mouth every 6 (six) hours as needed for Pain.     senna-docusate 8.6-50 mg 8.6-50 mg per tablet  Commonly known as: PERICOLACE  Take 1 tablet by mouth 2 (two) times daily as needed for Constipation (Second line).               Where to Get Your Medications        These medications were sent to [id number] - [place] - [address] [facility] AT [facility]  [address] [place] [id number]      Hours: 24-hours Phone: [phone]   ALPRAZolam 0.25 MG tablet  oxyCODONE 10 mg Tab immediate release tablet       These medications were sent to [id number] - [place] - [facility] [address] AT [facility] & [address] [place] [address] [address] [address]      Phone: [phone]   acetaminophen 325 MG tablet  amLODIPine 5 MG tablet  atorvastatin 20 MG tablet  cetirizine 10 MG tablet  hydrOXYzine pamoate 25 MG Cap  lisinopriL 10 MG tablet  senna-docusate 8.6-50 mg 8.6-50 mg per tablet         Discharge Instructions:         Zoë Bone is being discharged Home or Self Care.    Discharge Procedure Orders   Ambulatory referral/consult to ENT   Standing Status: Future   Referral Priority: Urgent Referral Type: Consultation   Referral Reason: Specialty Services Required   Requested Specialty: Otolaryngology   Number of Visits Requested: 1     Ambulatory referral/consult to Hematology / Oncology   Standing Status: Future   Referral Priority: Urgent Referral Type: Consultation   Referral Reason: Specialty Services Required   Requested Specialty: Hematology and Oncology   Number of Visits Requested: 1        Follow-Up Appointments:   Follow-up Information       Doris Tobias PA-C. Go in 2 week(s).     Specialty: Family Medicine  Contact information:  [address]   [place] [address]  [phone]               [facility] - Emergency Dept. Go to.    Specialty: Emergency Medicine  Why: If symptoms worsen  Contact information:  [address] [facility] [address] [phone]             [facility]-ENT, Entrance 6. Schedule an appointment as soon as possible for a visit in 2 week(s).    Specialty: Otolaryngology  Contact information:  [address] [facility] [address] [phone]  Additional information:  [facility] - ENT,  Entrance #6   Please sign with the  when you arrive.             [facility] - Hematology and Oncology. Schedule an appointment as soon as possible for a visit in 2 week(s).    Specialty: Hematology and Oncology  Contact information:  [address] [facility] [address] [phone]                             TIME SPENT ON DISCHARGE: 35 minutes      Chintan Gayle DO  [facility] Internal Medicine PGY-II

## 2024-04-29 LAB
ALBUMIN 24H UR ELPH-MRATE: 56.2 MG/24 H
ALBUMIN/GLOB 24H UR ELPH: 0.56 {RATIO}
ALPHA1 GLOB 24H UR ELPH-MRATE: 9.4 MG/24 H
ALPHA2 GLOB 24H UR ELPH-MRATE: 39 MG/24 H
B-GLOBULIN 24H UR ELPH-MRATE: 15.6 MG/24 H
COLLECT DURATION TIME UR: 24 H
GAMMA GLOB 24H UR ELPH-MRATE: 35.9 MG/24 H
PROT 24H UR-MRATE: 156 MG/24 H
PROT PATTERN 24H UR ELPH-IMP: NORMAL
SPECIMEN VOL 24H UR: 2600 ML

## 2024-05-31 LAB
BEAKER SEE SCANNED REPORT: NORMAL
BEAKER SEE SCANNED REPORT: NORMAL

## 2024-06-06 LAB
ESTRIOL SERPL-MCNC: ABNORMAL NG/ML
ESTROGEN SERPL-MCNC: ABNORMAL PG/ML
INSULIN SERPL-ACNC: ABNORMAL U[IU]/ML
LAB AP CLINICAL INFORMATION: ABNORMAL
LAB AP COMMENTS: ABNORMAL
LAB AP EBUS/EUS SPECIMEN: ABNORMAL
LAB AP GROSS DESCRIPTION: ABNORMAL
LAB AP NON-GYN INTERPRETATION SPECIMEN 1 (MULTI CAT): ABNORMAL
LAB AP PATHOLOGIST ADEQUACY INTERP: ABNORMAL

## 2024-07-30 ENCOUNTER — HOSPITAL ENCOUNTER (INPATIENT)
Facility: HOSPITAL | Age: 69
LOS: 3 days | Discharge: HOSPICE | DRG: 299 | End: 2024-08-02
Attending: STUDENT IN AN ORGANIZED HEALTH CARE EDUCATION/TRAINING PROGRAM | Admitting: INTERNAL MEDICINE
Payer: MEDICARE

## 2024-07-30 DIAGNOSIS — E87.1 HYPONATREMIA: ICD-10-CM

## 2024-07-30 DIAGNOSIS — I26.99 PULMONARY EMBOLI: ICD-10-CM

## 2024-07-30 DIAGNOSIS — R09.89 SUSPECTED DEEP VEIN THROMBOSIS (DVT): ICD-10-CM

## 2024-07-30 DIAGNOSIS — R07.9 CHEST PAIN: ICD-10-CM

## 2024-07-30 DIAGNOSIS — I82.C19: ICD-10-CM

## 2024-07-30 PROBLEM — D70.1 CHEMOTHERAPY-INDUCED NEUTROPENIA: Status: ACTIVE | Noted: 2024-07-30

## 2024-07-30 PROBLEM — D69.59 CHEMOTHERAPY-INDUCED THROMBOCYTOPENIA: Status: ACTIVE | Noted: 2024-07-30

## 2024-07-30 PROBLEM — T45.1X5A CHEMOTHERAPY-INDUCED NEUTROPENIA: Status: ACTIVE | Noted: 2024-07-30

## 2024-07-30 PROBLEM — C44.42 SQUAMOUS CELL CARCINOMA OF HEAD AND NECK: Status: ACTIVE | Noted: 2024-07-30

## 2024-07-30 PROBLEM — G89.3 CANCER-RELATED PAIN: Status: ACTIVE | Noted: 2024-07-30

## 2024-07-30 PROBLEM — R13.10 ODYNOPHAGIA: Status: ACTIVE | Noted: 2024-07-30

## 2024-07-30 PROBLEM — T45.1X5A CHEMOTHERAPY-INDUCED THROMBOCYTOPENIA: Status: ACTIVE | Noted: 2024-07-30

## 2024-07-30 LAB
ABS NEUT (OLG): 0.55 X10(3)/MCL (ref 2.1–9.2)
ALBUMIN SERPL-MCNC: 3.2 G/DL (ref 3.4–4.8)
ALBUMIN/GLOB SERPL: 0.8 RATIO (ref 1.1–2)
ALP SERPL-CCNC: 73 UNIT/L (ref 40–150)
ALT SERPL-CCNC: 14 UNIT/L (ref 0–55)
ANION GAP SERPL CALC-SCNC: 11 MEQ/L
APTT PPP: 24.4 SECONDS (ref 23.2–33.7)
AST SERPL-CCNC: 17 UNIT/L (ref 5–34)
BACTERIA #/AREA URNS AUTO: ABNORMAL /HPF
BILIRUB SERPL-MCNC: 0.8 MG/DL
BILIRUB UR QL STRIP.AUTO: NEGATIVE
BUN SERPL-MCNC: 15.1 MG/DL (ref 9.8–20.1)
CALCIUM SERPL-MCNC: 10.6 MG/DL (ref 8.4–10.2)
CHLORIDE SERPL-SCNC: 85 MMOL/L (ref 98–107)
CLARITY UR: CLEAR
CO2 SERPL-SCNC: 22 MMOL/L (ref 23–31)
COLOR UR AUTO: ABNORMAL
CREAT SERPL-MCNC: 0.79 MG/DL (ref 0.55–1.02)
CREAT/UREA NIT SERPL: 19
ERYTHROCYTE [DISTWIDTH] IN BLOOD BY AUTOMATED COUNT: 14.4 % (ref 11.5–17)
EST. AVERAGE GLUCOSE BLD GHB EST-MCNC: 182.9 MG/DL
GFR SERPLBLD CREATININE-BSD FMLA CKD-EPI: >60 ML/MIN/1.73/M2
GLOBULIN SER-MCNC: 3.9 GM/DL (ref 2.4–3.5)
GLUCOSE SERPL-MCNC: 288 MG/DL (ref 82–115)
GLUCOSE UR QL STRIP: ABNORMAL
HBA1C MFR BLD: 8 %
HCT VFR BLD AUTO: 32 % (ref 37–47)
HGB BLD-MCNC: 11.5 G/DL (ref 12–16)
HGB UR QL STRIP: NEGATIVE
INR PPP: 1
INSTRUMENT WBC (OLG): 1.05 X10(3)/MCL
KETONES UR QL STRIP: NEGATIVE
LACTATE SERPL-SCNC: 1.9 MMOL/L (ref 0.5–2.2)
LACTATE SERPL-SCNC: 2.6 MMOL/L (ref 0.5–2.2)
LEUKOCYTE ESTERASE UR QL STRIP: NEGATIVE
LYMPHOCYTES NFR BLD MANUAL: 0.45 X10(3)/MCL
LYMPHOCYTES NFR BLD MANUAL: 43 %
MACROCYTES BLD QL SMEAR: ABNORMAL
MCH RBC QN AUTO: 29.7 PG (ref 27–31)
MCHC RBC AUTO-ENTMCNC: 35.9 G/DL (ref 33–36)
MCV RBC AUTO: 82.7 FL (ref 80–94)
MONOCYTES NFR BLD MANUAL: 0.05 X10(3)/MCL (ref 0.1–1.3)
MONOCYTES NFR BLD MANUAL: 5 %
NEUTROPHILS NFR BLD MANUAL: 52 %
NITRITE UR QL STRIP: NEGATIVE
NRBC BLD AUTO-RTO: 0 %
NRBC BLD MANUAL-RTO: 4 %
OSMOLALITY SERPL: 265 MOSM/KG (ref 280–300)
OSMOLALITY UR: 422 MOSM/KG (ref 300–1300)
PH UR STRIP: 7 [PH]
PLATELET # BLD AUTO: 98 X10(3)/MCL (ref 130–400)
PLATELET # BLD EST: ABNORMAL 10*3/UL
PMV BLD AUTO: 9.8 FL (ref 7.4–10.4)
POCT GLUCOSE: 187 MG/DL (ref 70–110)
POTASSIUM SERPL-SCNC: 3.8 MMOL/L (ref 3.5–5.1)
PROT SERPL-MCNC: 7.1 GM/DL (ref 5.8–7.6)
PROT UR QL STRIP: ABNORMAL
PROTHROMBIN TIME: 13.1 SECONDS (ref 12.5–14.5)
RBC # BLD AUTO: 3.87 X10(6)/MCL (ref 4.2–5.4)
RBC #/AREA URNS AUTO: ABNORMAL /HPF
RBC MORPH BLD: ABNORMAL
SODIUM SERPL-SCNC: 118 MMOL/L (ref 136–145)
SODIUM UR-SCNC: 65 MMOL/L
SP GR UR STRIP.AUTO: 1.01 (ref 1–1.03)
SQUAMOUS #/AREA URNS LPF: ABNORMAL /HPF
STREP A PCR (OHS): NOT DETECTED
TSH SERPL-ACNC: 0.65 UIU/ML (ref 0.35–4.94)
UROBILINOGEN UR STRIP-ACNC: 2
WBC # BLD AUTO: 1.05 X10(3)/MCL (ref 4.5–11.5)
WBC #/AREA URNS AUTO: ABNORMAL /HPF

## 2024-07-30 PROCEDURE — 96375 TX/PRO/DX INJ NEW DRUG ADDON: CPT

## 2024-07-30 PROCEDURE — 96365 THER/PROPH/DIAG IV INF INIT: CPT

## 2024-07-30 PROCEDURE — 83036 HEMOGLOBIN GLYCOSYLATED A1C: CPT | Performed by: INTERNAL MEDICINE

## 2024-07-30 PROCEDURE — 87581 M.PNEUMON DNA AMP PROBE: CPT | Performed by: INTERNAL MEDICINE

## 2024-07-30 PROCEDURE — 87641 MR-STAPH DNA AMP PROBE: CPT | Performed by: INTERNAL MEDICINE

## 2024-07-30 PROCEDURE — 84443 ASSAY THYROID STIM HORMONE: CPT | Performed by: INTERNAL MEDICINE

## 2024-07-30 PROCEDURE — 21400001 HC TELEMETRY ROOM

## 2024-07-30 PROCEDURE — 96361 HYDRATE IV INFUSION ADD-ON: CPT

## 2024-07-30 PROCEDURE — 63600175 PHARM REV CODE 636 W HCPCS: Performed by: STUDENT IN AN ORGANIZED HEALTH CARE EDUCATION/TRAINING PROGRAM

## 2024-07-30 PROCEDURE — 99285 EMERGENCY DEPT VISIT HI MDM: CPT | Mod: 25

## 2024-07-30 PROCEDURE — 25000003 PHARM REV CODE 250: Performed by: INTERNAL MEDICINE

## 2024-07-30 PROCEDURE — 80053 COMPREHEN METABOLIC PANEL: CPT | Performed by: NURSE PRACTITIONER

## 2024-07-30 PROCEDURE — 96367 TX/PROPH/DG ADDL SEQ IV INF: CPT

## 2024-07-30 PROCEDURE — 11000001 HC ACUTE MED/SURG PRIVATE ROOM

## 2024-07-30 PROCEDURE — 84300 ASSAY OF URINE SODIUM: CPT | Performed by: INTERNAL MEDICINE

## 2024-07-30 PROCEDURE — 25500020 PHARM REV CODE 255: Performed by: STUDENT IN AN ORGANIZED HEALTH CARE EDUCATION/TRAINING PROGRAM

## 2024-07-30 PROCEDURE — 85007 BL SMEAR W/DIFF WBC COUNT: CPT | Performed by: NURSE PRACTITIONER

## 2024-07-30 PROCEDURE — 87651 STREP A DNA AMP PROBE: CPT | Performed by: INTERNAL MEDICINE

## 2024-07-30 PROCEDURE — 85610 PROTHROMBIN TIME: CPT | Performed by: NURSE PRACTITIONER

## 2024-07-30 PROCEDURE — 25000003 PHARM REV CODE 250: Performed by: STUDENT IN AN ORGANIZED HEALTH CARE EDUCATION/TRAINING PROGRAM

## 2024-07-30 PROCEDURE — 85730 THROMBOPLASTIN TIME PARTIAL: CPT | Performed by: NURSE PRACTITIONER

## 2024-07-30 PROCEDURE — 87486 CHLMYD PNEUM DNA AMP PROBE: CPT | Performed by: INTERNAL MEDICINE

## 2024-07-30 PROCEDURE — 85027 COMPLETE CBC AUTOMATED: CPT | Performed by: NURSE PRACTITIONER

## 2024-07-30 PROCEDURE — 63600175 PHARM REV CODE 636 W HCPCS: Performed by: NURSE PRACTITIONER

## 2024-07-30 PROCEDURE — 87798 DETECT AGENT NOS DNA AMP: CPT | Performed by: INTERNAL MEDICINE

## 2024-07-30 PROCEDURE — 25500020 PHARM REV CODE 255: Performed by: INTERNAL MEDICINE

## 2024-07-30 PROCEDURE — 83935 ASSAY OF URINE OSMOLALITY: CPT | Performed by: INTERNAL MEDICINE

## 2024-07-30 PROCEDURE — 81001 URINALYSIS AUTO W/SCOPE: CPT | Performed by: NURSE PRACTITIONER

## 2024-07-30 PROCEDURE — 83605 ASSAY OF LACTIC ACID: CPT | Performed by: STUDENT IN AN ORGANIZED HEALTH CARE EDUCATION/TRAINING PROGRAM

## 2024-07-30 PROCEDURE — 63600175 PHARM REV CODE 636 W HCPCS: Performed by: INTERNAL MEDICINE

## 2024-07-30 PROCEDURE — 83930 ASSAY OF BLOOD OSMOLALITY: CPT | Performed by: INTERNAL MEDICINE

## 2024-07-30 PROCEDURE — 87040 BLOOD CULTURE FOR BACTERIA: CPT | Performed by: STUDENT IN AN ORGANIZED HEALTH CARE EDUCATION/TRAINING PROGRAM

## 2024-07-30 PROCEDURE — 0241U COVID/RSV/FLU A&B PCR: CPT | Performed by: INTERNAL MEDICINE

## 2024-07-30 RX ORDER — PROCHLORPERAZINE EDISYLATE 5 MG/ML
5 INJECTION INTRAMUSCULAR; INTRAVENOUS EVERY 6 HOURS PRN
Status: DISCONTINUED | OUTPATIENT
Start: 2024-07-30 | End: 2024-08-01

## 2024-07-30 RX ORDER — IBUPROFEN 200 MG
24 TABLET ORAL
Status: DISCONTINUED | OUTPATIENT
Start: 2024-07-30 | End: 2024-07-30

## 2024-07-30 RX ORDER — GLUCAGON 1 MG
1 KIT INJECTION
Status: DISCONTINUED | OUTPATIENT
Start: 2024-07-30 | End: 2024-08-02 | Stop reason: HOSPADM

## 2024-07-30 RX ORDER — IBUPROFEN 200 MG
16 TABLET ORAL
Status: DISCONTINUED | OUTPATIENT
Start: 2024-07-30 | End: 2024-07-30

## 2024-07-30 RX ORDER — DEXAMETHASONE SODIUM PHOSPHATE 4 MG/ML
8 INJECTION, SOLUTION INTRA-ARTICULAR; INTRALESIONAL; INTRAMUSCULAR; INTRAVENOUS; SOFT TISSUE
Status: DISCONTINUED | OUTPATIENT
Start: 2024-07-30 | End: 2024-07-30

## 2024-07-30 RX ORDER — GLUCAGON 1 MG
1 KIT INJECTION
Status: DISCONTINUED | OUTPATIENT
Start: 2024-07-30 | End: 2024-07-30

## 2024-07-30 RX ORDER — HYDROMORPHONE HYDROCHLORIDE 2 MG/ML
1 INJECTION, SOLUTION INTRAMUSCULAR; INTRAVENOUS; SUBCUTANEOUS EVERY 4 HOURS PRN
Status: DISCONTINUED | OUTPATIENT
Start: 2024-07-30 | End: 2024-08-01

## 2024-07-30 RX ORDER — DEXAMETHASONE SODIUM PHOSPHATE 4 MG/ML
8 INJECTION, SOLUTION INTRA-ARTICULAR; INTRALESIONAL; INTRAMUSCULAR; INTRAVENOUS; SOFT TISSUE EVERY 12 HOURS
Status: DISCONTINUED | OUTPATIENT
Start: 2024-07-31 | End: 2024-07-31

## 2024-07-30 RX ORDER — HYDROMORPHONE HYDROCHLORIDE 2 MG/ML
1 INJECTION, SOLUTION INTRAMUSCULAR; INTRAVENOUS; SUBCUTANEOUS
Status: COMPLETED | OUTPATIENT
Start: 2024-07-30 | End: 2024-07-30

## 2024-07-30 RX ORDER — INSULIN ASPART 100 [IU]/ML
1-10 INJECTION, SOLUTION INTRAVENOUS; SUBCUTANEOUS EVERY 6 HOURS PRN
Status: DISCONTINUED | OUTPATIENT
Start: 2024-07-30 | End: 2024-08-02 | Stop reason: HOSPADM

## 2024-07-30 RX ORDER — INSULIN ASPART 100 [IU]/ML
0-10 INJECTION, SOLUTION INTRAVENOUS; SUBCUTANEOUS
Status: DISCONTINUED | OUTPATIENT
Start: 2024-07-30 | End: 2024-07-30

## 2024-07-30 RX ORDER — BISACODYL 10 MG/1
10 SUPPOSITORY RECTAL DAILY PRN
Status: DISCONTINUED | OUTPATIENT
Start: 2024-07-30 | End: 2024-08-02 | Stop reason: HOSPADM

## 2024-07-30 RX ORDER — VANCOMYCIN HCL IN 5 % DEXTROSE 1G/250ML
20 PLASTIC BAG, INJECTION (ML) INTRAVENOUS
Status: COMPLETED | OUTPATIENT
Start: 2024-07-30 | End: 2024-07-30

## 2024-07-30 RX ORDER — NYSTATIN 100000 [USP'U]/ML
500000 SUSPENSION ORAL 4 TIMES DAILY
Status: DISCONTINUED | OUTPATIENT
Start: 2024-07-30 | End: 2024-08-02 | Stop reason: HOSPADM

## 2024-07-30 RX ORDER — ONDANSETRON HYDROCHLORIDE 2 MG/ML
4 INJECTION, SOLUTION INTRAVENOUS
Status: COMPLETED | OUTPATIENT
Start: 2024-07-30 | End: 2024-07-30

## 2024-07-30 RX ORDER — DEXAMETHASONE SODIUM PHOSPHATE 4 MG/ML
8 INJECTION, SOLUTION INTRA-ARTICULAR; INTRALESIONAL; INTRAMUSCULAR; INTRAVENOUS; SOFT TISSUE
Status: DISCONTINUED | OUTPATIENT
Start: 2024-07-30 | End: 2024-07-31

## 2024-07-30 RX ORDER — INSULIN GLARGINE 100 [IU]/ML
10 INJECTION, SOLUTION SUBCUTANEOUS ONCE
Status: COMPLETED | OUTPATIENT
Start: 2024-07-30 | End: 2024-07-30

## 2024-07-30 RX ORDER — VANCOMYCIN HCL IN 5 % DEXTROSE 1G/250ML
20 PLASTIC BAG, INJECTION (ML) INTRAVENOUS
Status: DISCONTINUED | OUTPATIENT
Start: 2024-07-31 | End: 2024-08-01

## 2024-07-30 RX ORDER — ONDANSETRON HYDROCHLORIDE 2 MG/ML
4 INJECTION, SOLUTION INTRAVENOUS EVERY 4 HOURS PRN
Status: DISCONTINUED | OUTPATIENT
Start: 2024-07-30 | End: 2024-08-01

## 2024-07-30 RX ORDER — HYDROMORPHONE HYDROCHLORIDE 2 MG/ML
0.5 INJECTION, SOLUTION INTRAMUSCULAR; INTRAVENOUS; SUBCUTANEOUS EVERY 4 HOURS PRN
Status: DISCONTINUED | OUTPATIENT
Start: 2024-07-30 | End: 2024-08-01

## 2024-07-30 RX ORDER — SODIUM CHLORIDE 0.9 % (FLUSH) 0.9 %
10 SYRINGE (ML) INJECTION
Status: DISCONTINUED | OUTPATIENT
Start: 2024-07-30 | End: 2024-08-02 | Stop reason: HOSPADM

## 2024-07-30 RX ORDER — SODIUM CHLORIDE 9 MG/ML
INJECTION, SOLUTION INTRAVENOUS CONTINUOUS
Status: DISCONTINUED | OUTPATIENT
Start: 2024-07-30 | End: 2024-07-31

## 2024-07-30 RX ADMIN — SODIUM CHLORIDE 1000 ML: 9 INJECTION, SOLUTION INTRAVENOUS at 03:07

## 2024-07-30 RX ADMIN — PIPERACILLIN AND TAZOBACTAM 4.5 G: 4; .5 INJECTION, POWDER, LYOPHILIZED, FOR SOLUTION INTRAVENOUS; PARENTERAL at 04:07

## 2024-07-30 RX ADMIN — HYDROMORPHONE HYDROCHLORIDE 1 MG: 2 INJECTION INTRAMUSCULAR; INTRAVENOUS; SUBCUTANEOUS at 08:07

## 2024-07-30 RX ADMIN — INSULIN GLARGINE 10 UNITS: 100 INJECTION, SOLUTION SUBCUTANEOUS at 10:07

## 2024-07-30 RX ADMIN — HYDROMORPHONE HYDROCHLORIDE 1 MG: 2 INJECTION INTRAMUSCULAR; INTRAVENOUS; SUBCUTANEOUS at 03:07

## 2024-07-30 RX ADMIN — DIPHENHYDRAMINE HYDROCHLORIDE 10 ML: 25 SOLUTION ORAL at 10:07

## 2024-07-30 RX ADMIN — IOHEXOL 100 ML: 350 INJECTION, SOLUTION INTRAVENOUS at 04:07

## 2024-07-30 RX ADMIN — SODIUM CHLORIDE: 9 INJECTION, SOLUTION INTRAVENOUS at 08:07

## 2024-07-30 RX ADMIN — NYSTATIN 500000 UNITS: 100000 SUSPENSION ORAL at 10:07

## 2024-07-30 RX ADMIN — VANCOMYCIN HYDROCHLORIDE 1000 MG: 1 INJECTION, POWDER, LYOPHILIZED, FOR SOLUTION INTRAVENOUS at 05:07

## 2024-07-30 RX ADMIN — SODIUM CHLORIDE, POTASSIUM CHLORIDE, SODIUM LACTATE AND CALCIUM CHLORIDE 1000 ML: 600; 310; 30; 20 INJECTION, SOLUTION INTRAVENOUS at 07:07

## 2024-07-30 RX ADMIN — ONDANSETRON 4 MG: 2 INJECTION INTRAMUSCULAR; INTRAVENOUS at 03:07

## 2024-07-30 RX ADMIN — PIPERACILLIN AND TAZOBACTAM 4.5 G: 4; .5 INJECTION, POWDER, LYOPHILIZED, FOR SOLUTION INTRAVENOUS; PARENTERAL at 11:07

## 2024-07-30 RX ADMIN — IOHEXOL 100 ML: 350 INJECTION, SOLUTION INTRAVENOUS at 09:07

## 2024-07-31 LAB
ABS NEUT (OLG): 0.87 X10(3)/MCL (ref 2.1–9.2)
ALBUMIN SERPL-MCNC: 2.8 G/DL (ref 3.4–4.8)
ALBUMIN/GLOB SERPL: 1 RATIO (ref 1.1–2)
ALP SERPL-CCNC: 58 UNIT/L (ref 40–150)
ALT SERPL-CCNC: 10 UNIT/L (ref 0–55)
ANION GAP SERPL CALC-SCNC: 11 MEQ/L
ANION GAP SERPL CALC-SCNC: 9 MEQ/L
AST SERPL-CCNC: 15 UNIT/L (ref 5–34)
B PERT.PT PRMT NPH QL NAA+NON-PROBE: NOT DETECTED
BILIRUB SERPL-MCNC: 0.6 MG/DL
BUN SERPL-MCNC: 7.3 MG/DL (ref 9.8–20.1)
BUN SERPL-MCNC: 9.6 MG/DL (ref 9.8–20.1)
C PNEUM DNA NPH QL NAA+NON-PROBE: NOT DETECTED
CALCIUM SERPL-MCNC: 9.1 MG/DL (ref 8.4–10.2)
CALCIUM SERPL-MCNC: 9.3 MG/DL (ref 8.4–10.2)
CHLORIDE SERPL-SCNC: 92 MMOL/L (ref 98–107)
CHLORIDE SERPL-SCNC: 94 MMOL/L (ref 98–107)
CO2 SERPL-SCNC: 24 MMOL/L (ref 23–31)
CO2 SERPL-SCNC: 24 MMOL/L (ref 23–31)
CREAT SERPL-MCNC: 0.63 MG/DL (ref 0.55–1.02)
CREAT SERPL-MCNC: 0.65 MG/DL (ref 0.55–1.02)
CREAT/UREA NIT SERPL: 12
CREAT/UREA NIT SERPL: 15
ERYTHROCYTE [DISTWIDTH] IN BLOOD BY AUTOMATED COUNT: 14.4 % (ref 11.5–17)
FERRITIN SERPL-MCNC: 2366.81 NG/ML (ref 4.63–204)
FLUAV AG UPPER RESP QL IA.RAPID: NOT DETECTED
FLUBV AG UPPER RESP QL IA.RAPID: NOT DETECTED
FOLATE SERPL-MCNC: 5.6 NG/ML (ref 7–31.4)
GFR SERPLBLD CREATININE-BSD FMLA CKD-EPI: >60 ML/MIN/1.73/M2
GFR SERPLBLD CREATININE-BSD FMLA CKD-EPI: >60 ML/MIN/1.73/M2
GIANT PLATELETS: ABNORMAL
GLOBULIN SER-MCNC: 2.9 GM/DL (ref 2.4–3.5)
GLUCOSE SERPL-MCNC: 147 MG/DL (ref 82–115)
GLUCOSE SERPL-MCNC: 182 MG/DL (ref 82–115)
HADV DNA NPH QL NAA+NON-PROBE: NOT DETECTED
HCOV 229E RNA NPH QL NAA+NON-PROBE: NOT DETECTED
HCOV HKU1 RNA NPH QL NAA+NON-PROBE: NOT DETECTED
HCOV NL63 RNA NPH QL NAA+NON-PROBE: NOT DETECTED
HCOV OC43 RNA NPH QL NAA+NON-PROBE: NOT DETECTED
HCT VFR BLD AUTO: 25.6 % (ref 37–47)
HGB BLD-MCNC: 9.4 G/DL (ref 12–16)
HMPV RNA NPH QL NAA+NON-PROBE: NOT DETECTED
HPIV1 RNA NPH QL NAA+NON-PROBE: NOT DETECTED
HPIV2 RNA NPH QL NAA+NON-PROBE: NOT DETECTED
HPIV3 RNA NPH QL NAA+NON-PROBE: NOT DETECTED
HPIV4 RNA NPH QL NAA+NON-PROBE: NOT DETECTED
INSTRUMENT WBC (OLG): 1.56 X10(3)/MCL
IRON SATN MFR SERPL: 38 % (ref 20–50)
IRON SERPL-MCNC: 85 UG/DL (ref 50–170)
LYMPHOCYTES NFR BLD MANUAL: 0.55 X10(3)/MCL
LYMPHOCYTES NFR BLD MANUAL: 35 %
M PNEUMO DNA NPH QL NAA+NON-PROBE: NOT DETECTED
MAGNESIUM SERPL-MCNC: 1.8 MG/DL (ref 1.6–2.6)
MCH RBC QN AUTO: 30.6 PG (ref 27–31)
MCHC RBC AUTO-ENTMCNC: 36.7 G/DL (ref 33–36)
MCV RBC AUTO: 83.4 FL (ref 80–94)
MONOCYTES NFR BLD MANUAL: 0.14 X10(3)/MCL (ref 0.1–1.3)
MONOCYTES NFR BLD MANUAL: 9 %
MRSA PCR SCRN (OHS): NOT DETECTED
NEUTROPHILS NFR BLD MANUAL: 56 %
NRBC BLD AUTO-RTO: 0 %
PHOSPHATE SERPL-MCNC: 1.7 MG/DL (ref 2.3–4.7)
PLATELET # BLD AUTO: 60 X10(3)/MCL (ref 130–400)
PLATELET # BLD EST: ABNORMAL 10*3/UL
PMV BLD AUTO: 8.8 FL (ref 7.4–10.4)
POCT GLUCOSE: 206 MG/DL (ref 70–110)
POIKILOCYTOSIS BLD QL SMEAR: ABNORMAL
POTASSIUM SERPL-SCNC: 3.2 MMOL/L (ref 3.5–5.1)
POTASSIUM SERPL-SCNC: 3.8 MMOL/L (ref 3.5–5.1)
PROT SERPL-MCNC: 5.7 GM/DL (ref 5.8–7.6)
RBC # BLD AUTO: 3.07 X10(6)/MCL (ref 4.2–5.4)
RBC MORPH BLD: ABNORMAL
RSV A 5' UTR RNA NPH QL NAA+PROBE: NOT DETECTED
RSV RNA NPH QL NAA+NON-PROBE: NOT DETECTED
RV+EV RNA NPH QL NAA+NON-PROBE: NOT DETECTED
SARS-COV-2 RNA RESP QL NAA+PROBE: NOT DETECTED
SODIUM SERPL-SCNC: 127 MMOL/L (ref 136–145)
SODIUM SERPL-SCNC: 127 MMOL/L (ref 136–145)
STOMATOCYTES (OLG): ABNORMAL
TIBC SERPL-MCNC: 138 UG/DL (ref 70–310)
TIBC SERPL-MCNC: 223 UG/DL (ref 250–450)
TRANSFERRIN SERPL-MCNC: 205 MG/DL (ref 173–360)
VIT B12 SERPL-MCNC: 596 PG/ML (ref 213–816)
WBC # BLD AUTO: 1.56 X10(3)/MCL (ref 4.5–11.5)

## 2024-07-31 PROCEDURE — 83540 ASSAY OF IRON: CPT | Performed by: INTERNAL MEDICINE

## 2024-07-31 PROCEDURE — 25000003 PHARM REV CODE 250: Performed by: INTERNAL MEDICINE

## 2024-07-31 PROCEDURE — 83550 IRON BINDING TEST: CPT | Performed by: INTERNAL MEDICINE

## 2024-07-31 PROCEDURE — 82746 ASSAY OF FOLIC ACID SERUM: CPT | Performed by: INTERNAL MEDICINE

## 2024-07-31 PROCEDURE — 82728 ASSAY OF FERRITIN: CPT | Performed by: INTERNAL MEDICINE

## 2024-07-31 PROCEDURE — 83735 ASSAY OF MAGNESIUM: CPT | Performed by: INTERNAL MEDICINE

## 2024-07-31 PROCEDURE — 63600175 PHARM REV CODE 636 W HCPCS: Performed by: INTERNAL MEDICINE

## 2024-07-31 PROCEDURE — 63600175 PHARM REV CODE 636 W HCPCS: Performed by: NURSE PRACTITIONER

## 2024-07-31 PROCEDURE — 21400001 HC TELEMETRY ROOM

## 2024-07-31 PROCEDURE — 85027 COMPLETE CBC AUTOMATED: CPT | Performed by: INTERNAL MEDICINE

## 2024-07-31 PROCEDURE — 36415 COLL VENOUS BLD VENIPUNCTURE: CPT | Performed by: INTERNAL MEDICINE

## 2024-07-31 PROCEDURE — 82607 VITAMIN B-12: CPT | Performed by: INTERNAL MEDICINE

## 2024-07-31 PROCEDURE — 80053 COMPREHEN METABOLIC PANEL: CPT | Performed by: INTERNAL MEDICINE

## 2024-07-31 PROCEDURE — 84100 ASSAY OF PHOSPHORUS: CPT | Performed by: INTERNAL MEDICINE

## 2024-07-31 RX ORDER — HYDRALAZINE HYDROCHLORIDE 20 MG/ML
10 INJECTION INTRAMUSCULAR; INTRAVENOUS EVERY 6 HOURS PRN
Status: DISCONTINUED | OUTPATIENT
Start: 2024-07-31 | End: 2024-08-02 | Stop reason: HOSPADM

## 2024-07-31 RX ORDER — DEXAMETHASONE SODIUM PHOSPHATE 4 MG/ML
10 INJECTION, SOLUTION INTRA-ARTICULAR; INTRALESIONAL; INTRAMUSCULAR; INTRAVENOUS; SOFT TISSUE EVERY 12 HOURS
Status: DISCONTINUED | OUTPATIENT
Start: 2024-07-31 | End: 2024-08-02 | Stop reason: HOSPADM

## 2024-07-31 RX ORDER — INSULIN GLARGINE 100 [IU]/ML
10 INJECTION, SOLUTION SUBCUTANEOUS NIGHTLY
Status: DISCONTINUED | OUTPATIENT
Start: 2024-07-31 | End: 2024-08-02 | Stop reason: HOSPADM

## 2024-07-31 RX ADMIN — PIPERACILLIN AND TAZOBACTAM 4.5 G: 4; .5 INJECTION, POWDER, LYOPHILIZED, FOR SOLUTION INTRAVENOUS; PARENTERAL at 08:07

## 2024-07-31 RX ADMIN — HYDROMORPHONE HYDROCHLORIDE 1 MG: 2 INJECTION INTRAMUSCULAR; INTRAVENOUS; SUBCUTANEOUS at 01:07

## 2024-07-31 RX ADMIN — HYDROMORPHONE HYDROCHLORIDE 1 MG: 2 INJECTION INTRAMUSCULAR; INTRAVENOUS; SUBCUTANEOUS at 12:07

## 2024-07-31 RX ADMIN — DIPHENHYDRAMINE HYDROCHLORIDE 10 ML: 25 SOLUTION ORAL at 05:07

## 2024-07-31 RX ADMIN — HYDROMORPHONE HYDROCHLORIDE 0.5 MG: 2 INJECTION INTRAMUSCULAR; INTRAVENOUS; SUBCUTANEOUS at 11:07

## 2024-07-31 RX ADMIN — HYDROMORPHONE HYDROCHLORIDE 1 MG: 2 INJECTION INTRAMUSCULAR; INTRAVENOUS; SUBCUTANEOUS at 08:07

## 2024-07-31 RX ADMIN — ASCORBIC ACID, VITAMIN A PALMITATE, CHOLECALCIFEROL, THIAMINE HYDROCHLORIDE, RIBOFLAVIN-5 PHOSPHATE SODIUM, PYRIDOXINE HYDROCHLORIDE, NIACINAMIDE, DEXPANTHENOL, ALPHA-TOCOPHEROL ACETATE, VITAMIN K1, FOLIC ACID, BIOTIN, CYANOCOBALAMIN: 200; 3300; 200; 6; 3.6; 6; 40; 15; 10; 150; 600; 60; 5 INJECTION, SOLUTION INTRAVENOUS at 06:07

## 2024-07-31 RX ADMIN — FOLIC ACID 5 MG: 5 INJECTION, SOLUTION INTRAMUSCULAR; INTRAVENOUS; SUBCUTANEOUS at 09:07

## 2024-07-31 RX ADMIN — NYSTATIN 500000 UNITS: 100000 SUSPENSION ORAL at 09:07

## 2024-07-31 RX ADMIN — NYSTATIN 500000 UNITS: 100000 SUSPENSION ORAL at 05:07

## 2024-07-31 RX ADMIN — DEXAMETHASONE SODIUM PHOSPHATE 10 MG: 4 INJECTION, SOLUTION INTRA-ARTICULAR; INTRALESIONAL; INTRAMUSCULAR; INTRAVENOUS; SOFT TISSUE at 09:07

## 2024-07-31 RX ADMIN — PIPERACILLIN AND TAZOBACTAM 4.5 G: 4; .5 INJECTION, POWDER, LYOPHILIZED, FOR SOLUTION INTRAVENOUS; PARENTERAL at 05:07

## 2024-07-31 RX ADMIN — HYDROMORPHONE HYDROCHLORIDE 1 MG: 2 INJECTION INTRAMUSCULAR; INTRAVENOUS; SUBCUTANEOUS at 04:07

## 2024-07-31 RX ADMIN — POTASSIUM PHOSPHATE, MONOBASIC AND POTASSIUM PHOSPHATE, DIBASIC 30 MMOL: 224; 236 INJECTION, SOLUTION, CONCENTRATE INTRAVENOUS at 10:07

## 2024-07-31 RX ADMIN — DEXAMETHASONE SODIUM PHOSPHATE 10 MG: 4 INJECTION, SOLUTION INTRA-ARTICULAR; INTRALESIONAL; INTRAMUSCULAR; INTRAVENOUS; SOFT TISSUE at 08:07

## 2024-07-31 RX ADMIN — NYSTATIN 500000 UNITS: 100000 SUSPENSION ORAL at 08:07

## 2024-07-31 RX ADMIN — DIPHENHYDRAMINE HYDROCHLORIDE 10 ML: 25 SOLUTION ORAL at 08:07

## 2024-07-31 RX ADMIN — DIPHENHYDRAMINE HYDROCHLORIDE 10 ML: 25 SOLUTION ORAL at 12:07

## 2024-07-31 RX ADMIN — SODIUM CHLORIDE: 9 INJECTION, SOLUTION INTRAVENOUS at 04:07

## 2024-07-31 RX ADMIN — INSULIN ASPART 4 UNITS: 100 INJECTION, SOLUTION INTRAVENOUS; SUBCUTANEOUS at 12:07

## 2024-07-31 RX ADMIN — VANCOMYCIN HYDROCHLORIDE 1000 MG: 1 INJECTION, POWDER, LYOPHILIZED, FOR SOLUTION INTRAVENOUS at 04:07

## 2024-07-31 RX ADMIN — HYDROMORPHONE HYDROCHLORIDE 1 MG: 2 INJECTION INTRAMUSCULAR; INTRAVENOUS; SUBCUTANEOUS at 09:07

## 2024-07-31 RX ADMIN — NYSTATIN 500000 UNITS: 100000 SUSPENSION ORAL at 12:07

## 2024-07-31 RX ADMIN — INSULIN GLARGINE 10 UNITS: 100 INJECTION, SOLUTION SUBCUTANEOUS at 09:07

## 2024-08-01 PROBLEM — Z51.5 COMFORT MEASURES ONLY STATUS: Status: ACTIVE | Noted: 2024-08-01

## 2024-08-01 LAB
ANION GAP SERPL CALC-SCNC: 13 MEQ/L
APICAL FOUR CHAMBER EJECTION FRACTION: 62 %
APICAL TWO CHAMBER EJECTION FRACTION: 68 %
AV INDEX (PROSTH): 0.64
AV MEAN GRADIENT: 3 MMHG
AV PEAK GRADIENT: 5 MMHG
AV VALVE AREA BY VELOCITY RATIO: 2.36 CM²
AV VALVE AREA: 1.81 CM²
AV VELOCITY RATIO: 0.83
BASOPHILS # BLD AUTO: 0 X10(3)/MCL
BASOPHILS NFR BLD AUTO: 0 %
BUN SERPL-MCNC: 10.2 MG/DL (ref 9.8–20.1)
CALCIUM SERPL-MCNC: 9.3 MG/DL (ref 8.4–10.2)
CHLORIDE SERPL-SCNC: 92 MMOL/L (ref 98–107)
CO2 SERPL-SCNC: 21 MMOL/L (ref 23–31)
CREAT SERPL-MCNC: 0.69 MG/DL (ref 0.55–1.02)
CREAT/UREA NIT SERPL: 15
CV ECHO LV RWT: 0.75 CM
DOP CALC AO PEAK VEL: 1.13 M/S
DOP CALC AO VTI: 17.5 CM
DOP CALC LVOT AREA: 2.8 CM2
DOP CALC LVOT DIAMETER: 1.9 CM
DOP CALC LVOT PEAK VEL: 0.94 M/S
DOP CALC LVOT STROKE VOLUME: 31.74 CM3
DOP CALC MV VTI: 13.8 CM
DOP CALCLVOT PEAK VEL VTI: 11.2 CM
E WAVE DECELERATION TIME: 87.04 MSEC
E/A RATIO: 0.39
ECHO LV POSTERIOR WALL: 1.2 CM (ref 0.6–1.1)
EOSINOPHIL # BLD AUTO: 0 X10(3)/MCL (ref 0–0.9)
EOSINOPHIL NFR BLD AUTO: 0 %
ERYTHROCYTE [DISTWIDTH] IN BLOOD BY AUTOMATED COUNT: 14.7 % (ref 11.5–17)
FRACTIONAL SHORTENING: 44 % (ref 28–44)
GFR SERPLBLD CREATININE-BSD FMLA CKD-EPI: >60 ML/MIN/1.73/M2
GLUCOSE SERPL-MCNC: 258 MG/DL (ref 82–115)
HCT VFR BLD AUTO: 28.1 % (ref 37–47)
HGB BLD-MCNC: 10.1 G/DL (ref 12–16)
IMM GRANULOCYTES # BLD AUTO: 0.03 X10(3)/MCL (ref 0–0.04)
IMM GRANULOCYTES NFR BLD AUTO: 1.2 %
INTERVENTRICULAR SEPTUM: 1.1 CM (ref 0.6–1.1)
LEFT ATRIUM AREA SYSTOLIC (APICAL 2 CHAMBER): 10.9 CM2
LEFT ATRIUM AREA SYSTOLIC (APICAL 4 CHAMBER): 9.58 CM2
LEFT ATRIUM SIZE: 2.8 CM
LEFT ATRIUM VOLUME MOD: 19.4 CM3
LEFT INTERNAL DIMENSION IN SYSTOLE: 1.8 CM (ref 2.1–4)
LEFT VENTRICLE DIASTOLIC VOLUME: 40.96 ML
LEFT VENTRICLE END DIASTOLIC VOLUME APICAL 2 CHAMBER: 28.8 ML
LEFT VENTRICLE END DIASTOLIC VOLUME APICAL 4 CHAMBER: 28.1 ML
LEFT VENTRICLE END SYSTOLIC VOLUME APICAL 2 CHAMBER: 20.1 ML
LEFT VENTRICLE END SYSTOLIC VOLUME APICAL 4 CHAMBER: 17.2 ML
LEFT VENTRICLE SYSTOLIC VOLUME: 9.72 ML
LEFT VENTRICULAR INTERNAL DIMENSION IN DIASTOLE: 3.2 CM (ref 3.5–6)
LEFT VENTRICULAR MASS: 111.76 G
LVED V (TEICH): 40.96 ML
LVES V (TEICH): 9.72 ML
LVOT MG: 2 MMHG
LVOT MV: 0.6 CM/S
LYMPHOCYTES # BLD AUTO: 0.59 X10(3)/MCL (ref 0.6–4.6)
LYMPHOCYTES NFR BLD AUTO: 23.3 %
MAGNESIUM SERPL-MCNC: 2.1 MG/DL (ref 1.6–2.6)
MCH RBC QN AUTO: 30.1 PG (ref 27–31)
MCHC RBC AUTO-ENTMCNC: 35.9 G/DL (ref 33–36)
MCV RBC AUTO: 83.9 FL (ref 80–94)
MONOCYTES # BLD AUTO: 0.25 X10(3)/MCL (ref 0.1–1.3)
MONOCYTES NFR BLD AUTO: 9.9 %
MV MEAN GRADIENT: 2 MMHG
MV PEAK A VEL: 1.46 M/S
MV PEAK E VEL: 0.57 M/S
MV PEAK GRADIENT: 4 MMHG
MV STENOSIS PRESSURE HALF TIME: 394 MS
MV VALVE AREA BY CONTINUITY EQUATION: 2.3 CM2
MV VALVE AREA P 1/2 METHOD: 0.56 CM2
NEUTROPHILS # BLD AUTO: 1.66 X10(3)/MCL (ref 2.1–9.2)
NEUTROPHILS NFR BLD AUTO: 65.6 %
NRBC BLD AUTO-RTO: 0 %
OHS LV EJECTION FRACTION SIMPSONS BIPLANE MOD: 64 %
PHOSPHATE SERPL-MCNC: 2.9 MG/DL (ref 2.3–4.7)
PISA TR MAX VEL: 2.33 M/S
PLATELET # BLD AUTO: 67 X10(3)/MCL (ref 130–400)
PLATELETS.RETICULATED NFR BLD AUTO: 2.5 % (ref 0.9–11.2)
PMV BLD AUTO: 9.1 FL (ref 7.4–10.4)
POCT GLUCOSE: 222 MG/DL (ref 70–110)
POCT GLUCOSE: 234 MG/DL (ref 70–110)
POCT GLUCOSE: 258 MG/DL (ref 70–110)
POTASSIUM SERPL-SCNC: 4 MMOL/L (ref 3.5–5.1)
PV PEAK GRADIENT: 5 MMHG
PV PEAK VELOCITY: 1.15 M/S
RBC # BLD AUTO: 3.35 X10(6)/MCL (ref 4.2–5.4)
SODIUM SERPL-SCNC: 126 MMOL/L (ref 136–145)
TR MAX PG: 22 MMHG
TRICUSPID ANNULAR PLANE SYSTOLIC EXCURSION: 2.35 CM
VANCOMYCIN TROUGH SERPL-MCNC: 6.1 UG/ML (ref 15–20)
WBC # BLD AUTO: 2.53 X10(3)/MCL (ref 4.5–11.5)

## 2024-08-01 PROCEDURE — 85025 COMPLETE CBC W/AUTO DIFF WBC: CPT | Performed by: INTERNAL MEDICINE

## 2024-08-01 PROCEDURE — 36415 COLL VENOUS BLD VENIPUNCTURE: CPT | Performed by: INTERNAL MEDICINE

## 2024-08-01 PROCEDURE — 63600175 PHARM REV CODE 636 W HCPCS: Performed by: STUDENT IN AN ORGANIZED HEALTH CARE EDUCATION/TRAINING PROGRAM

## 2024-08-01 PROCEDURE — 0CJS8ZZ INSPECTION OF LARYNX, VIA NATURAL OR ARTIFICIAL OPENING ENDOSCOPIC: ICD-10-PCS | Performed by: OTOLARYNGOLOGY

## 2024-08-01 PROCEDURE — 21400001 HC TELEMETRY ROOM

## 2024-08-01 PROCEDURE — 25000003 PHARM REV CODE 250: Performed by: INTERNAL MEDICINE

## 2024-08-01 PROCEDURE — 25500020 PHARM REV CODE 255: Performed by: STUDENT IN AN ORGANIZED HEALTH CARE EDUCATION/TRAINING PROGRAM

## 2024-08-01 PROCEDURE — 84100 ASSAY OF PHOSPHORUS: CPT | Performed by: INTERNAL MEDICINE

## 2024-08-01 PROCEDURE — 80048 BASIC METABOLIC PNL TOTAL CA: CPT | Performed by: INTERNAL MEDICINE

## 2024-08-01 PROCEDURE — 83735 ASSAY OF MAGNESIUM: CPT | Performed by: INTERNAL MEDICINE

## 2024-08-01 PROCEDURE — 80202 ASSAY OF VANCOMYCIN: CPT | Performed by: INTERNAL MEDICINE

## 2024-08-01 PROCEDURE — 25500020 PHARM REV CODE 255: Performed by: NURSE PRACTITIONER

## 2024-08-01 PROCEDURE — 63600175 PHARM REV CODE 636 W HCPCS: Performed by: NURSE PRACTITIONER

## 2024-08-01 PROCEDURE — 63600175 PHARM REV CODE 636 W HCPCS: Performed by: INTERNAL MEDICINE

## 2024-08-01 RX ORDER — HYDROMORPHONE HYDROCHLORIDE 2 MG/ML
2 INJECTION, SOLUTION INTRAMUSCULAR; INTRAVENOUS; SUBCUTANEOUS EVERY 4 HOURS PRN
Status: DISCONTINUED | OUTPATIENT
Start: 2024-08-01 | End: 2024-08-02

## 2024-08-01 RX ORDER — MORPHINE SULFATE 4 MG/ML
4 INJECTION, SOLUTION INTRAMUSCULAR; INTRAVENOUS EVERY 6 HOURS PRN
Status: DISCONTINUED | OUTPATIENT
Start: 2024-08-01 | End: 2024-08-02 | Stop reason: HOSPADM

## 2024-08-01 RX ORDER — ENOXAPARIN SODIUM 100 MG/ML
1 INJECTION SUBCUTANEOUS EVERY 12 HOURS
Status: DISCONTINUED | OUTPATIENT
Start: 2024-08-01 | End: 2024-08-02 | Stop reason: HOSPADM

## 2024-08-01 RX ORDER — HALOPERIDOL 5 MG/ML
1 INJECTION INTRAMUSCULAR EVERY 4 HOURS PRN
Status: DISCONTINUED | OUTPATIENT
Start: 2024-08-01 | End: 2024-08-02 | Stop reason: HOSPADM

## 2024-08-01 RX ORDER — LORAZEPAM 1 MG/1
1 TABLET ORAL EVERY 4 HOURS PRN
Status: DISCONTINUED | OUTPATIENT
Start: 2024-08-01 | End: 2024-08-02 | Stop reason: HOSPADM

## 2024-08-01 RX ORDER — GLYCOPYRROLATE 0.2 MG/ML
0.1 INJECTION INTRAMUSCULAR; INTRAVENOUS 3 TIMES DAILY PRN
Status: DISCONTINUED | OUTPATIENT
Start: 2024-08-01 | End: 2024-08-02 | Stop reason: HOSPADM

## 2024-08-01 RX ORDER — METOCLOPRAMIDE HYDROCHLORIDE 5 MG/ML
5 INJECTION INTRAMUSCULAR; INTRAVENOUS EVERY 6 HOURS PRN
Status: DISCONTINUED | OUTPATIENT
Start: 2024-08-01 | End: 2024-08-02 | Stop reason: HOSPADM

## 2024-08-01 RX ORDER — MORPHINE SULFATE 4 MG/ML
2 INJECTION, SOLUTION INTRAMUSCULAR; INTRAVENOUS EVERY 4 HOURS PRN
Status: DISCONTINUED | OUTPATIENT
Start: 2024-08-01 | End: 2024-08-02 | Stop reason: HOSPADM

## 2024-08-01 RX ORDER — VANCOMYCIN HCL IN 5 % DEXTROSE 1G/250ML
20 PLASTIC BAG, INJECTION (ML) INTRAVENOUS
Status: DISCONTINUED | OUTPATIENT
Start: 2024-08-01 | End: 2024-08-01

## 2024-08-01 RX ORDER — ONDANSETRON HYDROCHLORIDE 2 MG/ML
8 INJECTION, SOLUTION INTRAVENOUS EVERY 8 HOURS PRN
Status: DISCONTINUED | OUTPATIENT
Start: 2024-08-01 | End: 2024-08-02 | Stop reason: HOSPADM

## 2024-08-01 RX ORDER — MUPIROCIN 20 MG/G
OINTMENT TOPICAL 2 TIMES DAILY
Status: DISCONTINUED | OUTPATIENT
Start: 2024-08-02 | End: 2024-08-02 | Stop reason: HOSPADM

## 2024-08-01 RX ORDER — MORPHINE SULFATE 4 MG/ML
4 INJECTION, SOLUTION INTRAMUSCULAR; INTRAVENOUS
Status: DISCONTINUED | OUTPATIENT
Start: 2024-08-01 | End: 2024-08-02 | Stop reason: HOSPADM

## 2024-08-01 RX ADMIN — PIPERACILLIN AND TAZOBACTAM 4.5 G: 4; .5 INJECTION, POWDER, LYOPHILIZED, FOR SOLUTION INTRAVENOUS; PARENTERAL at 05:08

## 2024-08-01 RX ADMIN — DEXAMETHASONE SODIUM PHOSPHATE 10 MG: 4 INJECTION, SOLUTION INTRA-ARTICULAR; INTRALESIONAL; INTRAMUSCULAR; INTRAVENOUS; SOFT TISSUE at 08:08

## 2024-08-01 RX ADMIN — VANCOMYCIN HYDROCHLORIDE 1000 MG: 1 INJECTION, POWDER, LYOPHILIZED, FOR SOLUTION INTRAVENOUS at 05:08

## 2024-08-01 RX ADMIN — ENOXAPARIN SODIUM 60 MG: 60 INJECTION SUBCUTANEOUS at 02:08

## 2024-08-01 RX ADMIN — HYDROMORPHONE HYDROCHLORIDE 1 MG: 2 INJECTION INTRAMUSCULAR; INTRAVENOUS; SUBCUTANEOUS at 05:08

## 2024-08-01 RX ADMIN — INSULIN ASPART 6 UNITS: 100 INJECTION, SOLUTION INTRAVENOUS; SUBCUTANEOUS at 05:08

## 2024-08-01 RX ADMIN — HALOPERIDOL LACTATE 1 MG: 5 INJECTION, SOLUTION INTRAMUSCULAR at 07:08

## 2024-08-01 RX ADMIN — IOHEXOL 50 ML: 300 INJECTION, SOLUTION INTRAVENOUS at 12:08

## 2024-08-01 RX ADMIN — INSULIN GLARGINE 10 UNITS: 100 INJECTION, SOLUTION SUBCUTANEOUS at 08:08

## 2024-08-01 RX ADMIN — MORPHINE SULFATE 4 MG: 4 INJECTION, SOLUTION INTRAMUSCULAR; INTRAVENOUS at 09:08

## 2024-08-01 RX ADMIN — NYSTATIN 500000 UNITS: 100000 SUSPENSION ORAL at 02:08

## 2024-08-01 RX ADMIN — WHITE PETROLATUM: 1.75 OINTMENT TOPICAL at 09:08

## 2024-08-01 RX ADMIN — PIPERACILLIN AND TAZOBACTAM 4.5 G: 4; .5 INJECTION, POWDER, LYOPHILIZED, FOR SOLUTION INTRAVENOUS; PARENTERAL at 09:08

## 2024-08-01 RX ADMIN — HYDROMORPHONE HYDROCHLORIDE 2 MG: 2 INJECTION INTRAMUSCULAR; INTRAVENOUS; SUBCUTANEOUS at 12:08

## 2024-08-01 RX ADMIN — DIPHENHYDRAMINE HYDROCHLORIDE 10 ML: 25 SOLUTION ORAL at 05:08

## 2024-08-01 RX ADMIN — PERFLUTREN 1 ML: 6.52 INJECTION, SUSPENSION INTRAVENOUS at 04:08

## 2024-08-01 RX ADMIN — MORPHINE SULFATE 4 MG: 4 INJECTION, SOLUTION INTRAMUSCULAR; INTRAVENOUS at 04:08

## 2024-08-01 RX ADMIN — NYSTATIN 500000 UNITS: 100000 SUSPENSION ORAL at 09:08

## 2024-08-01 RX ADMIN — PIPERACILLIN AND TAZOBACTAM 4.5 G: 4; .5 INJECTION, POWDER, LYOPHILIZED, FOR SOLUTION INTRAVENOUS; PARENTERAL at 01:08

## 2024-08-01 RX ADMIN — DEXAMETHASONE SODIUM PHOSPHATE 10 MG: 4 INJECTION, SOLUTION INTRA-ARTICULAR; INTRALESIONAL; INTRAMUSCULAR; INTRAVENOUS; SOFT TISSUE at 09:08

## 2024-08-01 RX ADMIN — NYSTATIN 500000 UNITS: 100000 SUSPENSION ORAL at 05:08

## 2024-08-01 RX ADMIN — HYDROMORPHONE HYDROCHLORIDE 2 MG: 2 INJECTION INTRAMUSCULAR; INTRAVENOUS; SUBCUTANEOUS at 08:08

## 2024-08-01 RX ADMIN — HYDROMORPHONE HYDROCHLORIDE 1 MG: 2 INJECTION INTRAMUSCULAR; INTRAVENOUS; SUBCUTANEOUS at 01:08

## 2024-08-02 VITALS
TEMPERATURE: 98 F | SYSTOLIC BLOOD PRESSURE: 136 MMHG | WEIGHT: 123.44 LBS | OXYGEN SATURATION: 98 % | BODY MASS INDEX: 22.58 KG/M2 | DIASTOLIC BLOOD PRESSURE: 90 MMHG | RESPIRATION RATE: 12 BRPM | HEART RATE: 124 BPM

## 2024-08-02 PROCEDURE — 63600175 PHARM REV CODE 636 W HCPCS: Performed by: STUDENT IN AN ORGANIZED HEALTH CARE EDUCATION/TRAINING PROGRAM

## 2024-08-02 PROCEDURE — 63600175 PHARM REV CODE 636 W HCPCS: Performed by: INTERNAL MEDICINE

## 2024-08-02 RX ORDER — HYDROMORPHONE HYDROCHLORIDE 2 MG/ML
1 INJECTION, SOLUTION INTRAMUSCULAR; INTRAVENOUS; SUBCUTANEOUS EVERY 4 HOURS PRN
Status: DISCONTINUED | OUTPATIENT
Start: 2024-08-02 | End: 2024-08-02 | Stop reason: HOSPADM

## 2024-08-02 RX ORDER — ENOXAPARIN SODIUM 100 MG/ML
1 INJECTION SUBCUTANEOUS EVERY 12 HOURS
Qty: 100 ML | Refills: 3 | Status: SHIPPED | OUTPATIENT
Start: 2024-08-02 | End: 2024-08-02 | Stop reason: HOSPADM

## 2024-08-02 RX ADMIN — DEXAMETHASONE SODIUM PHOSPHATE 10 MG: 4 INJECTION, SOLUTION INTRA-ARTICULAR; INTRALESIONAL; INTRAMUSCULAR; INTRAVENOUS; SOFT TISSUE at 09:08

## 2024-08-02 RX ADMIN — HYDROMORPHONE HYDROCHLORIDE 2 MG: 2 INJECTION INTRAMUSCULAR; INTRAVENOUS; SUBCUTANEOUS at 09:08

## 2024-08-02 RX ADMIN — ENOXAPARIN SODIUM 60 MG: 60 INJECTION SUBCUTANEOUS at 01:08

## 2024-08-02 RX ADMIN — HYDROMORPHONE HYDROCHLORIDE 2 MG: 2 INJECTION INTRAMUSCULAR; INTRAVENOUS; SUBCUTANEOUS at 01:08

## 2024-08-04 LAB
BACTERIA BLD CULT: NORMAL
BACTERIA BLD CULT: NORMAL